# Patient Record
Sex: FEMALE | Race: WHITE | NOT HISPANIC OR LATINO
[De-identification: names, ages, dates, MRNs, and addresses within clinical notes are randomized per-mention and may not be internally consistent; named-entity substitution may affect disease eponyms.]

---

## 2017-02-04 ENCOUNTER — TRANSCRIPTION ENCOUNTER (OUTPATIENT)
Age: 22
End: 2017-02-04

## 2017-04-30 ENCOUNTER — EMERGENCY (EMERGENCY)
Facility: HOSPITAL | Age: 22
LOS: 1 days | Discharge: HOME | End: 2017-04-30
Attending: EMERGENCY MEDICINE
Payer: COMMERCIAL

## 2017-04-30 VITALS
TEMPERATURE: 98 F | RESPIRATION RATE: 20 BRPM | DIASTOLIC BLOOD PRESSURE: 60 MMHG | SYSTOLIC BLOOD PRESSURE: 120 MMHG | OXYGEN SATURATION: 100 % | HEART RATE: 97 BPM | WEIGHT: 141.98 LBS

## 2017-04-30 DIAGNOSIS — R07.89 OTHER CHEST PAIN: ICD-10-CM

## 2017-04-30 DIAGNOSIS — Z79.899 OTHER LONG TERM (CURRENT) DRUG THERAPY: ICD-10-CM

## 2017-04-30 DIAGNOSIS — R00.2 PALPITATIONS: ICD-10-CM

## 2017-04-30 LAB
AMPHET UR-MCNC: NEGATIVE — SIGNIFICANT CHANGE UP
ANION GAP SERPL CALC-SCNC: 13 MMOL/L — SIGNIFICANT CHANGE UP (ref 5–17)
APAP SERPL-MCNC: <7.5 UG/ML — LOW (ref 10–26)
APPEARANCE UR: CLEAR — SIGNIFICANT CHANGE UP
BARBITURATES UR SCN-MCNC: NEGATIVE — SIGNIFICANT CHANGE UP
BASOPHILS # BLD AUTO: 0 K/UL — SIGNIFICANT CHANGE UP (ref 0–0.2)
BASOPHILS NFR BLD AUTO: 0.2 % — SIGNIFICANT CHANGE UP (ref 0–2)
BENZODIAZ UR-MCNC: NEGATIVE — SIGNIFICANT CHANGE UP
BILIRUB UR-MCNC: NEGATIVE — SIGNIFICANT CHANGE UP
BUN SERPL-MCNC: 18 MG/DL — SIGNIFICANT CHANGE UP (ref 8–20)
CALCIUM SERPL-MCNC: 9 MG/DL — SIGNIFICANT CHANGE UP (ref 8.6–10.2)
CHLORIDE SERPL-SCNC: 106 MMOL/L — SIGNIFICANT CHANGE UP (ref 98–107)
CK SERPL-CCNC: 123 U/L — SIGNIFICANT CHANGE UP (ref 25–170)
CO2 SERPL-SCNC: 23 MMOL/L — SIGNIFICANT CHANGE UP (ref 22–29)
COCAINE METAB.OTHER UR-MCNC: NEGATIVE — SIGNIFICANT CHANGE UP
COLOR SPEC: YELLOW — SIGNIFICANT CHANGE UP
CREAT SERPL-MCNC: 0.61 MG/DL — SIGNIFICANT CHANGE UP (ref 0.5–1.3)
DIFF PNL FLD: NEGATIVE — SIGNIFICANT CHANGE UP
EOSINOPHIL # BLD AUTO: 0.1 K/UL — SIGNIFICANT CHANGE UP (ref 0–0.5)
EOSINOPHIL NFR BLD AUTO: 0.9 % — SIGNIFICANT CHANGE UP (ref 0–6)
ETHANOL SERPL-MCNC: <10 MG/DL — SIGNIFICANT CHANGE UP
GLUCOSE SERPL-MCNC: 104 MG/DL — SIGNIFICANT CHANGE UP (ref 70–115)
GLUCOSE UR QL: NEGATIVE MG/DL — SIGNIFICANT CHANGE UP
HCG SERPL-ACNC: <2 MIU/ML — SIGNIFICANT CHANGE UP
HCT VFR BLD CALC: 38 % — SIGNIFICANT CHANGE UP (ref 37–47)
HGB BLD-MCNC: 12.6 G/DL — SIGNIFICANT CHANGE UP (ref 12–16)
KETONES UR-MCNC: NEGATIVE — SIGNIFICANT CHANGE UP
LEUKOCYTE ESTERASE UR-ACNC: NEGATIVE — SIGNIFICANT CHANGE UP
LYMPHOCYTES # BLD AUTO: 1.4 K/UL — SIGNIFICANT CHANGE UP (ref 1–4.8)
LYMPHOCYTES # BLD AUTO: 14.6 % — LOW (ref 20–55)
MCHC RBC-ENTMCNC: 29.4 PG — SIGNIFICANT CHANGE UP (ref 27–31)
MCHC RBC-ENTMCNC: 33.2 G/DL — SIGNIFICANT CHANGE UP (ref 32–36)
MCV RBC AUTO: 88.8 FL — SIGNIFICANT CHANGE UP (ref 81–99)
METHADONE UR-MCNC: NEGATIVE — SIGNIFICANT CHANGE UP
MONOCYTES # BLD AUTO: 0.8 K/UL — SIGNIFICANT CHANGE UP (ref 0–0.8)
MONOCYTES NFR BLD AUTO: 7.7 % — SIGNIFICANT CHANGE UP (ref 3–10)
NEUTROPHILS # BLD AUTO: 7.4 K/UL — SIGNIFICANT CHANGE UP (ref 1.8–8)
NEUTROPHILS NFR BLD AUTO: 76.4 % — HIGH (ref 37–73)
NITRITE UR-MCNC: NEGATIVE — SIGNIFICANT CHANGE UP
OPIATES UR-MCNC: NEGATIVE — SIGNIFICANT CHANGE UP
PCP SPEC-MCNC: SIGNIFICANT CHANGE UP
PCP UR-MCNC: NEGATIVE — SIGNIFICANT CHANGE UP
PH UR: 6.5 — SIGNIFICANT CHANGE UP (ref 5–8)
PLATELET # BLD AUTO: 310 K/UL — SIGNIFICANT CHANGE UP (ref 150–400)
POTASSIUM SERPL-MCNC: 4 MMOL/L — SIGNIFICANT CHANGE UP (ref 3.5–5.3)
POTASSIUM SERPL-SCNC: 4 MMOL/L — SIGNIFICANT CHANGE UP (ref 3.5–5.3)
PROT UR-MCNC: NEGATIVE MG/DL — SIGNIFICANT CHANGE UP
RBC # BLD: 4.28 M/UL — LOW (ref 4.4–5.2)
RBC # FLD: 12.7 % — SIGNIFICANT CHANGE UP (ref 11–15.6)
SALICYLATES SERPL-MCNC: <2 MG/DL — LOW (ref 10–20)
SODIUM SERPL-SCNC: 142 MMOL/L — SIGNIFICANT CHANGE UP (ref 135–145)
SP GR SPEC: 1.01 — SIGNIFICANT CHANGE UP (ref 1.01–1.02)
THC UR QL: NEGATIVE — SIGNIFICANT CHANGE UP
TROPONIN T SERPL-MCNC: <0.01 NG/ML — SIGNIFICANT CHANGE UP (ref 0–0.06)
TSH SERPL-MCNC: 1.51 UIU/ML — SIGNIFICANT CHANGE UP (ref 0.27–4.2)
UROBILINOGEN FLD QL: NEGATIVE MG/DL — SIGNIFICANT CHANGE UP
WBC # BLD: 9.7 K/UL — SIGNIFICANT CHANGE UP (ref 4.8–10.8)
WBC # FLD AUTO: 9.7 K/UL — SIGNIFICANT CHANGE UP (ref 4.8–10.8)

## 2017-04-30 PROCEDURE — 99284 EMERGENCY DEPT VISIT MOD MDM: CPT | Mod: 25

## 2017-04-30 PROCEDURE — 93010 ELECTROCARDIOGRAM REPORT: CPT

## 2017-04-30 PROCEDURE — 85027 COMPLETE CBC AUTOMATED: CPT

## 2017-04-30 PROCEDURE — 82550 ASSAY OF CK (CPK): CPT

## 2017-04-30 PROCEDURE — 71010: CPT | Mod: 26

## 2017-04-30 PROCEDURE — 71045 X-RAY EXAM CHEST 1 VIEW: CPT

## 2017-04-30 PROCEDURE — 84443 ASSAY THYROID STIM HORMONE: CPT

## 2017-04-30 PROCEDURE — 80307 DRUG TEST PRSMV CHEM ANLYZR: CPT

## 2017-04-30 PROCEDURE — 81003 URINALYSIS AUTO W/O SCOPE: CPT

## 2017-04-30 PROCEDURE — 99285 EMERGENCY DEPT VISIT HI MDM: CPT

## 2017-04-30 PROCEDURE — 80048 BASIC METABOLIC PNL TOTAL CA: CPT

## 2017-04-30 PROCEDURE — 84484 ASSAY OF TROPONIN QUANT: CPT

## 2017-04-30 PROCEDURE — 84702 CHORIONIC GONADOTROPIN TEST: CPT

## 2017-04-30 PROCEDURE — 93005 ELECTROCARDIOGRAM TRACING: CPT

## 2017-04-30 RX ORDER — SODIUM CHLORIDE 9 MG/ML
1000 INJECTION INTRAMUSCULAR; INTRAVENOUS; SUBCUTANEOUS ONCE
Qty: 0 | Refills: 0 | Status: COMPLETED | OUTPATIENT
Start: 2017-04-30 | End: 2017-04-30

## 2017-04-30 RX ORDER — SODIUM CHLORIDE 9 MG/ML
1000 INJECTION INTRAMUSCULAR; INTRAVENOUS; SUBCUTANEOUS
Qty: 0 | Refills: 0 | Status: DISCONTINUED | OUTPATIENT
Start: 2017-04-30 | End: 2017-05-04

## 2017-04-30 RX ADMIN — SODIUM CHLORIDE 125 MILLILITER(S): 9 INJECTION INTRAMUSCULAR; INTRAVENOUS; SUBCUTANEOUS at 20:04

## 2017-04-30 RX ADMIN — SODIUM CHLORIDE 2000 MILLILITER(S): 9 INJECTION INTRAMUSCULAR; INTRAVENOUS; SUBCUTANEOUS at 20:04

## 2017-04-30 NOTE — ED PROVIDER NOTE - OBJECTIVE STATEMENT
20 y/o female in ED c/o palpitations and chest discomfort today.  pt states h/o VT 3 yrs ago.  pt denies any fever, HA, n/v/d/abd pain.  tolerating PO.  no sick contacts or recent travel.  pt denies any drugs or alcohol

## 2017-04-30 NOTE — ED ADULT NURSE NOTE - OBJECTIVE STATEMENT
Pt received sitting on stretcher in NAD. Pt AOx3 C/o developing chest pain and SOB while walking up an aisle at work.  Neuro WNL. PERRLA. Lungs CTA, RR even unlabored. Ab soft non tender, + bowel sounds x 4quads. Denies Nausea, Vomiting, Diarrhea. Skin warm, dry, color appropriate for age and race.

## 2017-11-22 ENCOUNTER — TRANSCRIPTION ENCOUNTER (OUTPATIENT)
Age: 22
End: 2017-11-22

## 2018-06-13 ENCOUNTER — TRANSCRIPTION ENCOUNTER (OUTPATIENT)
Age: 23
End: 2018-06-13

## 2018-06-18 ENCOUNTER — TRANSCRIPTION ENCOUNTER (OUTPATIENT)
Age: 23
End: 2018-06-18

## 2018-06-22 ENCOUNTER — TRANSCRIPTION ENCOUNTER (OUTPATIENT)
Age: 23
End: 2018-06-22

## 2019-09-17 ENCOUNTER — EMERGENCY (EMERGENCY)
Facility: HOSPITAL | Age: 24
LOS: 1 days | Discharge: HOME | End: 2019-09-17
Attending: EMERGENCY MEDICINE
Payer: COMMERCIAL

## 2019-09-17 VITALS
RESPIRATION RATE: 22 BRPM | SYSTOLIC BLOOD PRESSURE: 124 MMHG | OXYGEN SATURATION: 98 % | DIASTOLIC BLOOD PRESSURE: 81 MMHG | HEART RATE: 100 BPM | TEMPERATURE: 98 F | HEIGHT: 66 IN | WEIGHT: 115.08 LBS

## 2019-09-17 PROCEDURE — 93005 ELECTROCARDIOGRAM TRACING: CPT

## 2019-09-17 PROCEDURE — 99283 EMERGENCY DEPT VISIT LOW MDM: CPT

## 2019-09-17 PROCEDURE — 93010 ELECTROCARDIOGRAM REPORT: CPT

## 2019-09-17 PROCEDURE — 99284 EMERGENCY DEPT VISIT MOD MDM: CPT

## 2019-09-17 RX ORDER — DIPHENHYDRAMINE HCL 50 MG
25 CAPSULE ORAL ONCE
Refills: 0 | Status: COMPLETED | OUTPATIENT
Start: 2019-09-17 | End: 2019-09-17

## 2019-09-17 RX ADMIN — Medication 40 MILLIGRAM(S): at 22:02

## 2019-09-17 RX ADMIN — Medication 25 MILLIGRAM(S): at 22:01

## 2019-09-17 NOTE — ED ADULT TRIAGE NOTE - CHIEF COMPLAINT QUOTE
"I'm airborne allergic to melania".  Pt was given benedryl at school for allergic reaction, unsure of dose.  Currently with dry intermittent cough, no angioedema, no rash, states throat has never closed before, usually just gets dizzy, has a cough and takes Benedryl.  "My arms and face feels like static", airway is patent, maintaining 98% oxygenation on room air.

## 2019-09-17 NOTE — ED STATDOCS - OBJECTIVE STATEMENT
23yoF; with pmh signif for allergy to tropic fruits; now p/w sob, nausea, and lightheadedness.  denies rash. denies tongue/throat swelling.  PMH: denies  SOCIAL: No tobacco/illicit substance use/socialEtOH

## 2019-09-17 NOTE — ED STATDOCS - PHYSICAL EXAMINATION
Gen: Alert, NAD  Head: NC, AT, PERRL, EOMI, normal lids/conjunctiva  ENT: B TM WNL, normal hearing, patent oropharynx without erythema/exudate, uvula midline  Neck: +supple, no tenderness/meningismus/JVD, +Trachea midline  Pulm: Bilateral BS, normal resp effort, no wheeze/stridor/retractions  CV: RRR, no M/R/G, +dist pulses  Skin: no rash  Neuro: grossly intact

## 2019-09-17 NOTE — ED STATDOCS - NS ED ROS FT
Constitutional: (-) fever  (-)chills  (-)sweats  Eyes/ENT: (-) blurry vision, (-) epistaxis  (-)rhinorrhea   (-) sore throat    Cardiovascular: (-) chest pain, (-) palpitations (-) edema   Respiratory: (-) cough, (+) shortness of breath   Gastrointestinal: (+)nausea  (-)vomiting, (-) diarrhea  (-) abdominal pain   :  (-)dysuria, (-)frequency, (-)urgency, (-)hematuria  Musculoskeletal: (-) neck pain, (-) back pain, (-) joint pain  Integumentary: (-) rash, (-) edema  Neurological: (-) headache, (-) altered mental status  (-)LOC

## 2019-09-18 NOTE — ED ADULT NURSE NOTE - OBJECTIVE STATEMENT
pt triaged, treated and dispo by provider, pt verbalized understanding of discharge instructions, pt medicated prior to discharge, refer to provider notes..

## 2019-09-18 NOTE — ED PROVIDER NOTE - PATIENT PORTAL LINK FT
You can access the FollowMyHealth Patient Portal offered by [facility] by registering at the following website: [email or web address]. By joining [email or web address]’s FollowMyHealth portal, you will also be able to view your health information using other applications (apps) compatible with our system.

## 2019-12-12 ENCOUNTER — APPOINTMENT (OUTPATIENT)
Dept: RHEUMATOLOGY | Facility: CLINIC | Age: 24
End: 2019-12-12
Payer: COMMERCIAL

## 2019-12-12 VITALS
WEIGHT: 126 LBS | BODY MASS INDEX: 22.32 KG/M2 | HEART RATE: 80 BPM | OXYGEN SATURATION: 99 % | DIASTOLIC BLOOD PRESSURE: 74 MMHG | HEIGHT: 63 IN | SYSTOLIC BLOOD PRESSURE: 111 MMHG

## 2019-12-12 DIAGNOSIS — R76.0 RAISED ANTIBODY TITER: ICD-10-CM

## 2019-12-12 PROCEDURE — 99204 OFFICE O/P NEW MOD 45 MIN: CPT

## 2019-12-13 NOTE — PHYSICAL EXAM
[General Appearance - Well Nourished] : well nourished [General Appearance - Alert] : alert [General Appearance - In No Acute Distress] : in no acute distress [General Appearance - Well Developed] : well developed [PERRL With Normal Accommodation] : pupils were equal in size, round, and reactive to light [Sclera] : the sclera and conjunctiva were normal [Both Tympanic Membranes Were Examined] : both tympanic membranes were normal [Examination Of The Oral Cavity] : the lips and gums were normal [Outer Ear] : the ears and nose were normal in appearance [Neck Appearance] : the appearance of the neck was normal [Oropharynx] : the oropharynx was normal [Auscultation Breath Sounds / Voice Sounds] : lungs were clear to auscultation bilaterally [Heart Rate And Rhythm] : heart rate was normal and rhythm regular [Heart Sounds Gallop] : no gallops [Heart Sounds] : normal S1 and S2 [Murmurs] : no murmurs [Bowel Sounds] : normal bowel sounds [Heart Sounds Pericardial Friction Rub] : no pericardial rub [Abdomen Soft] : soft [Skin Lesions] : no skin lesions [Abdomen Tenderness] : non-tender [] : no rash [No Focal Deficits] : no focal deficits [Oriented To Time, Place, And Person] : oriented to person, place, and time [No CVA Tenderness] : no ~M costovertebral angle tenderness [No Spinal Tenderness] : no spinal tenderness [Abnormal Walk] : normal gait [Nail Clubbing] : no clubbing  or cyanosis of the fingernails [Involuntary Movements] : no involuntary movements were seen [Musculoskeletal - Swelling] : no joint swelling seen [Motor Tone] : muscle strength and tone were normal [FreeTextEntry1] : +

## 2019-12-13 NOTE — HISTORY OF PRESENT ILLNESS
[FreeTextEntry1] : 24 year old female with PMH as listed below presents today for an initial evaluation for abnormal blood test. Found to have elevated ROMEO: 1:40? in the setting of hair loss, intermittent polyarthralgias, myalgias,  photosensitivity, oral ulcers, raynauds, fatigue. \par \par She recently started following dermatology for hair loss, not currently on therapy. \par \par Denies swelling to the joints. She states her joint pain is random, occurs once/twice weekly, lasts for few minutes and then resolves. Denies morning stiffness. Not currently taking any medications for pain. \par \par denies fever, chills, weight loss, weight gain, denies dry eye,eye pain, eye redness, vision changes, dry mouth,nasal congestion, difficulty swallowing,  denies ear pain, hearing changes, denies chest pain, chest palpitations, denies sob, denies nausea, vomiting, abdominal pain, diarrhea, constipation, blood in stool, denies dysuria, blood in the urine, denies rashes,  skin thickening, denies blood clots, denies weakness, numbness, syncope, falls, denies depression, anxiety, denies bruising easily\par \par

## 2019-12-13 NOTE — ASSESSMENT
[FreeTextEntry1] : 24 year old female presents today for an initial evaluation for abnormal blood test. Found to have elevated ROMEO: 1:40? in the setting of hair loss, intermittent polyarthralgias, myalgias,  photosensitivity, oral ulcers, raynauds, fatigue. Will do further w/o as below for underlying classic CTD/ systemic autoimmune disease.\par \par - labs as below\par - c/w dermatology f/u for hair loss\par - NSAIDS/Tylenol prn for pain (reviewed risk and benefits of medications)\par - further recommendations after reviewing results\par \par Discussed treatment plan with the patient. The patient was given the opportunity to ask questions and all questions were answered to their satisfaction.\par \par

## 2019-12-27 ENCOUNTER — APPOINTMENT (OUTPATIENT)
Dept: RHEUMATOLOGY | Facility: CLINIC | Age: 24
End: 2019-12-27
Payer: MEDICAID

## 2019-12-27 VITALS
HEART RATE: 95 BPM | HEIGHT: 66 IN | BODY MASS INDEX: 20.09 KG/M2 | RESPIRATION RATE: 17 BRPM | TEMPERATURE: 98.7 F | SYSTOLIC BLOOD PRESSURE: 110 MMHG | OXYGEN SATURATION: 99 % | DIASTOLIC BLOOD PRESSURE: 64 MMHG | WEIGHT: 125 LBS

## 2019-12-27 PROCEDURE — 99214 OFFICE O/P EST MOD 30 MIN: CPT

## 2019-12-27 NOTE — PHYSICAL EXAM
[General Appearance - Well Nourished] : well nourished [General Appearance - In No Acute Distress] : in no acute distress [General Appearance - Alert] : alert [General Appearance - Well Developed] : well developed [Sclera] : the sclera and conjunctiva were normal [Outer Ear] : the ears and nose were normal in appearance [Examination Of The Oral Cavity] : the lips and gums were normal [PERRL With Normal Accommodation] : pupils were equal in size, round, and reactive to light [Neck Appearance] : the appearance of the neck was normal [Both Tympanic Membranes Were Examined] : both tympanic membranes were normal [Oropharynx] : the oropharynx was normal [Auscultation Breath Sounds / Voice Sounds] : lungs were clear to auscultation bilaterally [Heart Sounds Gallop] : no gallops [Heart Sounds] : normal S1 and S2 [Heart Rate And Rhythm] : heart rate was normal and rhythm regular [Heart Sounds Pericardial Friction Rub] : no pericardial rub [Murmurs] : no murmurs [Bowel Sounds] : normal bowel sounds [Abdomen Tenderness] : non-tender [Abdomen Soft] : soft [No Spinal Tenderness] : no spinal tenderness [No CVA Tenderness] : no ~M costovertebral angle tenderness [Abnormal Walk] : normal gait [Musculoskeletal - Swelling] : no joint swelling seen [Involuntary Movements] : no involuntary movements were seen [Nail Clubbing] : no clubbing  or cyanosis of the fingernails [Skin Lesions] : no skin lesions [] : no rash [Motor Tone] : muscle strength and tone were normal [Oriented To Time, Place, And Person] : oriented to person, place, and time [No Focal Deficits] : no focal deficits [FreeTextEntry1] : +thin hair

## 2019-12-27 NOTE — HISTORY OF PRESENT ILLNESS
[FreeTextEntry1] : Pt presenting today for a f.u visit. \par Continues to have intermittent polyarthralgias, myalgias,  fatigue. \par Not currently taking any medications. \par Labs reviewed with pt.\par Denies fever, chills, CP, SOB, abd pain, new rashes. ROS unchanged from prior\par

## 2019-12-27 NOTE — ASSESSMENT
[FreeTextEntry1] : 24 year old female presents today for f/u for abnormal blood test. Found to have elevated ROMEO: 1:40? in the setting of hair loss, intermittent polyarthralgias, myalgias,  photosensitivity, oral ulcers, raynauds, fatigue. Labs  unremarkable. ROS and PE otherwise unremarkable for underlying classic CTD/ systemic autoimmune disease. May have a component of soft tissue rheumatism/fibromyalgia\par \par - c/w dermatology f/u for hair loss\par - encouraged proper diet, good sleep hygiene, exercise \par - start cyclobenzaprine 5mg daily prn at bedtime (reviewed risk and benefits of medications)\par - will consider starting low dose duloxetine in the future\par \par Discussed treatment plan with the patient. The patient was given the opportunity to ask questions and all questions were answered to their satisfaction.\par \par

## 2020-01-10 ENCOUNTER — TRANSCRIPTION ENCOUNTER (OUTPATIENT)
Age: 25
End: 2020-01-10

## 2020-01-17 ENCOUNTER — APPOINTMENT (OUTPATIENT)
Dept: GASTROENTEROLOGY | Facility: CLINIC | Age: 25
End: 2020-01-17
Payer: COMMERCIAL

## 2020-01-17 VITALS
HEART RATE: 70 BPM | RESPIRATION RATE: 15 BRPM | HEIGHT: 66 IN | BODY MASS INDEX: 20.25 KG/M2 | DIASTOLIC BLOOD PRESSURE: 60 MMHG | OXYGEN SATURATION: 98 % | WEIGHT: 126 LBS | SYSTOLIC BLOOD PRESSURE: 90 MMHG

## 2020-01-17 DIAGNOSIS — Z83.79 FAMILY HISTORY OF OTHER DISEASES OF THE DIGESTIVE SYSTEM: ICD-10-CM

## 2020-01-17 PROCEDURE — 99204 OFFICE O/P NEW MOD 45 MIN: CPT

## 2020-01-17 NOTE — CONSULT LETTER
[Dear  ___] : Dear  [unfilled], [Consult Letter:] : I had the pleasure of evaluating your patient, [unfilled]. [Please see my note below.] : Please see my note below. [Consult Closing:] : Thank you very much for allowing me to participate in the care of this patient.  If you have any questions, please do not hesitate to contact me. [FreeTextEntry3] : Very truly yours,\par \par BHARATH Ramirez MD\par [facility]\par Gastroenterology at [place]\par [address], [address]\[place] [address]\par Tel [phone]\par Fax [phone]

## 2020-01-17 NOTE — ASSESSMENT
[FreeTextEntry1] : Patient's complaints and physical exam are concerning for undiagnosed Crohn's ileitis.  Will send her for MR enterography.  Recent blood work, including inflammatory markers were normal.

## 2020-01-17 NOTE — PHYSICAL EXAM
[General Appearance - Alert] : alert [General Appearance - In No Acute Distress] : in no acute distress [Sclera] : the sclera and conjunctiva were normal [PERRL With Normal Accommodation] : pupils were equal in size, round, and reactive to light [Outer Ear] : the ears and nose were normal in appearance [Oropharynx] : the oropharynx was normal [Extraocular Movements] : extraocular movements were intact [Jugular Venous Distention Increased] : there was no jugular-venous distention [Neck Appearance] : the appearance of the neck was normal [Neck Cervical Mass (___cm)] : no neck mass was observed [] : no respiratory distress [Thyroid Diffuse Enlargement] : the thyroid was not enlarged [Thyroid Nodule] : there were no palpable thyroid nodules [Auscultation Breath Sounds / Voice Sounds] : lungs were clear to auscultation bilaterally [Heart Sounds] : normal S1 and S2 [Heart Rate And Rhythm] : heart rate was normal and rhythm regular [Murmurs] : no murmurs [Heart Sounds Gallop] : no gallops [Heart Sounds Pericardial Friction Rub] : no pericardial rub [Flat] : flat [Edema] : there was no peripheral edema [Normal] : normal to percussion [Suprapubic] : in the suprapubic area [No HSM] : no hepatosplenomegaly noted [RLQ] : in the right lower quadrant [Mass ___ cm] : a [unfilled] ~Ucm mass was palpable [Cervical Lymph Nodes Enlarged Anterior Bilaterally] : anterior cervical [Cervical Lymph Nodes Enlarged Posterior Bilaterally] : posterior cervical [Supraclavicular Lymph Nodes Enlarged Bilaterally] : supraclavicular [Nail Clubbing] : no clubbing  or cyanosis of the fingernails [Skin Color & Pigmentation] : normal skin color and pigmentation [No Focal Deficits] : no focal deficits [Skin Turgor] : normal skin turgor [Oriented To Time, Place, And Person] : oriented to person, place, and time [Affect] : the affect was normal [Impaired Insight] : insight and judgment were intact

## 2020-01-17 NOTE — HISTORY OF PRESENT ILLNESS
[de-identified] : This is a 24-year-old female patient with a past medical history fibromyalgia who was referred by her gynecologist for evaluation for right lower quadrant pain.  The patient reports a few month history of stabbing right lower quadrant pain and palpable swelling.  She works the swelling is somewhat improved and the pain is not occurring as often but she describes it as "like someone in driving a knife through me", about one month ago, she was experiencing pain every day, though now it has been less often.  She reports having not much of an appetite but describes her weight has been generally stable.  She had an abdominal ultrasound that was reportedly normal.  She reports moving her bowels three times daily and describes her stools as firm as soft.  She denies any rectal bleeding.  Her family history is notable for a maternal uncle with Crohn's disease.

## 2020-01-26 ENCOUNTER — FORM ENCOUNTER (OUTPATIENT)
Age: 25
End: 2020-01-26

## 2020-01-27 ENCOUNTER — APPOINTMENT (OUTPATIENT)
Dept: MRI IMAGING | Facility: CLINIC | Age: 25
End: 2020-01-27
Payer: MEDICAID

## 2020-01-27 ENCOUNTER — OUTPATIENT (OUTPATIENT)
Dept: OUTPATIENT SERVICES | Facility: HOSPITAL | Age: 25
LOS: 1 days | End: 2020-01-27
Payer: MEDICAID

## 2020-01-27 DIAGNOSIS — R10.31 RIGHT LOWER QUADRANT PAIN: ICD-10-CM

## 2020-01-27 DIAGNOSIS — Z00.8 ENCOUNTER FOR OTHER GENERAL EXAMINATION: ICD-10-CM

## 2020-01-27 PROCEDURE — 72197 MRI PELVIS W/O & W/DYE: CPT | Mod: 26

## 2020-01-27 PROCEDURE — 72197 MRI PELVIS W/O & W/DYE: CPT

## 2020-01-27 PROCEDURE — 74183 MRI ABD W/O CNTR FLWD CNTR: CPT

## 2020-01-27 PROCEDURE — 74183 MRI ABD W/O CNTR FLWD CNTR: CPT | Mod: 26

## 2020-02-10 DIAGNOSIS — R10.31 RIGHT LOWER QUADRANT PAIN: ICD-10-CM

## 2020-02-10 DIAGNOSIS — G89.29 RIGHT LOWER QUADRANT PAIN: ICD-10-CM

## 2020-03-27 ENCOUNTER — APPOINTMENT (OUTPATIENT)
Dept: RHEUMATOLOGY | Facility: CLINIC | Age: 25
End: 2020-03-27

## 2020-05-07 ENCOUNTER — APPOINTMENT (OUTPATIENT)
Dept: RHEUMATOLOGY | Facility: CLINIC | Age: 25
End: 2020-05-07
Payer: MEDICAID

## 2020-05-07 DIAGNOSIS — Z87.09 PERSONAL HISTORY OF OTHER DISEASES OF THE RESPIRATORY SYSTEM: ICD-10-CM

## 2020-05-07 DIAGNOSIS — L65.9 NONSCARRING HAIR LOSS, UNSPECIFIED: ICD-10-CM

## 2020-05-07 PROCEDURE — 99215 OFFICE O/P EST HI 40 MIN: CPT | Mod: 95

## 2020-05-12 PROBLEM — Z87.09 HISTORY OF ASTHMA: Status: RESOLVED | Noted: 2020-05-12 | Resolved: 2020-05-12

## 2020-05-12 PROBLEM — L65.9 ALOPECIA: Status: ACTIVE | Noted: 2020-05-12

## 2020-05-12 RX ORDER — CYCLOBENZAPRINE HYDROCHLORIDE 5 MG/1
5 TABLET, FILM COATED ORAL
Qty: 30 | Refills: 1 | Status: DISCONTINUED | COMMUNITY
Start: 2019-12-27 | End: 2020-05-12

## 2020-05-15 ENCOUNTER — APPOINTMENT (OUTPATIENT)
Dept: NEUROLOGY | Facility: CLINIC | Age: 25
End: 2020-05-15
Payer: MEDICAID

## 2020-05-15 PROCEDURE — 99203 OFFICE O/P NEW LOW 30 MIN: CPT | Mod: 95

## 2020-05-15 NOTE — PHYSICAL EXAM
[FreeTextEntry1] : Exam limited due to limitations of telephonic visit necessitated by coronavirus situation.\par \par \par Mental status: Alert, fully oriented, speech comprehension intact, recent memory intact\par \par Cranial nerves: No ptosis  Extraocular movements full. Facial strength and sensation are normal. Tongue midline.\par \par Motor: Strength grossly normal throughout. There is no drift. Unable to assess tone. No abnormal movements observed.\par \par Sensation: Intact to touch throughout\par \par Coordination: Finger-nose intact\par \par Reflexes unable to check\par \par Gait grossly normal.\par

## 2020-05-15 NOTE — ASSESSMENT
[FreeTextEntry1] : Intractable migraines.\par \par We will initiate prophylaxis with propranolol 40 mg BID. 1 month followup and by phone prior to that if needed.

## 2020-05-15 NOTE — HISTORY OF PRESENT ILLNESS
[FreeTextEntry1] : Reports migraine headaches since age 16. Gets severe throbbing pain that begins over eye and spreads.Severe photophobia. Nausea, rare vomiting. Blurriness of vision. Gets them 1-4 days a week. Uses Excedrin with little relief.\par \par No food or other triggers. No family history of migraines.

## 2020-05-15 NOTE — REASON FOR VISIT
[Home] : at home, [unfilled] , at the time of the visit. [Other Location: e.g. Home (Enter Location, City,State)___] : at [unfilled] [Patient] : the patient [Self] : self [Consultation] : a consultation visit

## 2020-05-19 ENCOUNTER — APPOINTMENT (OUTPATIENT)
Dept: RADIOLOGY | Facility: CLINIC | Age: 25
End: 2020-05-19
Payer: MEDICAID

## 2020-05-19 ENCOUNTER — OUTPATIENT (OUTPATIENT)
Dept: OUTPATIENT SERVICES | Facility: HOSPITAL | Age: 25
LOS: 1 days | End: 2020-05-19
Payer: MEDICAID

## 2020-05-19 DIAGNOSIS — R53.83 OTHER FATIGUE: ICD-10-CM

## 2020-05-19 DIAGNOSIS — M25.50 PAIN IN UNSPECIFIED JOINT: ICD-10-CM

## 2020-05-19 DIAGNOSIS — M79.641 PAIN IN RIGHT HAND: ICD-10-CM

## 2020-05-19 DIAGNOSIS — Z00.00 ENCOUNTER FOR GENERAL ADULT MEDICAL EXAMINATION WITHOUT ABNORMAL FINDINGS: ICD-10-CM

## 2020-05-19 DIAGNOSIS — I73.00 RAYNAUD'S SYNDROME WITHOUT GANGRENE: ICD-10-CM

## 2020-05-19 DIAGNOSIS — M79.7 FIBROMYALGIA: ICD-10-CM

## 2020-05-19 PROCEDURE — 73120 X-RAY EXAM OF HAND: CPT | Mod: 26,LT,RT

## 2020-05-19 PROCEDURE — 73100 X-RAY EXAM OF WRIST: CPT

## 2020-05-19 PROCEDURE — 71046 X-RAY EXAM CHEST 2 VIEWS: CPT | Mod: 26

## 2020-05-19 PROCEDURE — 73100 X-RAY EXAM OF WRIST: CPT | Mod: 26,LT,RT

## 2020-05-19 PROCEDURE — 73630 X-RAY EXAM OF FOOT: CPT | Mod: 26,LT,RT

## 2020-05-19 PROCEDURE — 72110 X-RAY EXAM L-2 SPINE 4/>VWS: CPT | Mod: 26

## 2020-05-19 PROCEDURE — 73120 X-RAY EXAM OF HAND: CPT

## 2020-05-19 PROCEDURE — 72110 X-RAY EXAM L-2 SPINE 4/>VWS: CPT

## 2020-05-19 PROCEDURE — 71046 X-RAY EXAM CHEST 2 VIEWS: CPT

## 2020-05-19 PROCEDURE — 73630 X-RAY EXAM OF FOOT: CPT

## 2020-06-18 ENCOUNTER — APPOINTMENT (OUTPATIENT)
Dept: NEUROLOGY | Facility: CLINIC | Age: 25
End: 2020-06-18
Payer: MEDICAID

## 2020-06-18 VITALS — BODY MASS INDEX: 21.21 KG/M2 | HEIGHT: 66 IN | WEIGHT: 132 LBS

## 2020-06-18 PROCEDURE — 99214 OFFICE O/P EST MOD 30 MIN: CPT

## 2020-06-18 RX ORDER — SULINDAC 200 MG/1
200 TABLET ORAL
Qty: 60 | Refills: 2 | Status: DISCONTINUED | COMMUNITY
Start: 2020-05-12 | End: 2020-06-18

## 2020-06-18 RX ORDER — ASPIRIN ENTERIC COATED TABLETS 81 MG 81 MG/1
81 TABLET, DELAYED RELEASE ORAL
Qty: 100 | Refills: 0 | Status: DISCONTINUED | COMMUNITY
Start: 2020-05-12 | End: 2020-06-18

## 2020-07-07 ENCOUNTER — APPOINTMENT (OUTPATIENT)
Dept: RHEUMATOLOGY | Facility: CLINIC | Age: 25
End: 2020-07-07

## 2020-07-28 ENCOUNTER — APPOINTMENT (OUTPATIENT)
Dept: RHEUMATOLOGY | Facility: CLINIC | Age: 25
End: 2020-07-28
Payer: MEDICAID

## 2020-07-28 VITALS
WEIGHT: 136.13 LBS | TEMPERATURE: 98 F | OXYGEN SATURATION: 99 % | RESPIRATION RATE: 17 BRPM | DIASTOLIC BLOOD PRESSURE: 66 MMHG | BODY MASS INDEX: 22.68 KG/M2 | SYSTOLIC BLOOD PRESSURE: 100 MMHG | HEIGHT: 65 IN | HEART RATE: 79 BPM

## 2020-07-28 DIAGNOSIS — K13.79 OTHER LESIONS OF ORAL MUCOSA: ICD-10-CM

## 2020-07-28 DIAGNOSIS — L65.9 NONSCARRING HAIR LOSS, UNSPECIFIED: ICD-10-CM

## 2020-07-28 DIAGNOSIS — I73.00 RAYNAUD'S SYNDROME W/OUT GANGRENE: ICD-10-CM

## 2020-07-28 PROCEDURE — 99213 OFFICE O/P EST LOW 20 MIN: CPT

## 2020-07-28 RX ORDER — CYCLOBENZAPRINE HYDROCHLORIDE 10 MG/1
10 TABLET, FILM COATED ORAL
Qty: 30 | Refills: 2 | Status: DISCONTINUED | COMMUNITY
Start: 2020-05-12 | End: 2020-07-28

## 2020-07-28 NOTE — HISTORY OF PRESENT ILLNESS
[FreeTextEntry1] : Pt presenting today for a f.u visit. Continues to have intermittent polyarthralgias, myalgias,  oral ulcers, fatigue. Denies swelling to the joints. Labs and imaging reviewed with pt. Not currently taking any medications for symptoms. s/p trail with cyclobenzaprine 10mg- stopped as it caused drowsiness. Sulindac- developed side effects. \par Denies fever, chills, CP, SOB, abd pain, rashes

## 2020-07-28 NOTE — ASSESSMENT
[FreeTextEntry1] : 24 year old female presents today for an f.u visit. Has hx of elevated ROMEO: 1:40 in the setting of hair loss, intermittent polyarthralgias, myalgias,  photosensitivity, oral ulcers, raynauds, fatigue. Labs and imaging thus far unremarkable. s/p trail with cyclobenzaprine 10mg daily- stopped as it caused drowsiness. Sulindac- developed side effects. \par \par - start duloxetine 30 mg daily  (reviewed risk and benefits of medications)\par - c/w dermatology f/u for hair loss\par - NSAIDS/Tylenol prn for pain (reviewed risk and benefits of medications)\par - encouraged proper diet, good sleep hygiene, exercise \par \par Discussed treatment plan with the patient. The patient was given the opportunity to ask questions and all questions were answered to their satisfaction.

## 2020-07-28 NOTE — PHYSICAL EXAM
[General Appearance - Alert] : alert [General Appearance - In No Acute Distress] : in no acute distress [General Appearance - Well Nourished] : well nourished [General Appearance - Well Developed] : well developed [Sclera] : the sclera and conjunctiva were normal [PERRL With Normal Accommodation] : pupils were equal in size, round, and reactive to light [Examination Of The Oral Cavity] : the lips and gums were normal [Outer Ear] : the ears and nose were normal in appearance [Oropharynx] : the oropharynx was normal [Both Tympanic Membranes Were Examined] : both tympanic membranes were normal [Neck Appearance] : the appearance of the neck was normal [Auscultation Breath Sounds / Voice Sounds] : lungs were clear to auscultation bilaterally [Heart Rate And Rhythm] : heart rate was normal and rhythm regular [Heart Sounds Gallop] : no gallops [Heart Sounds] : normal S1 and S2 [Murmurs] : no murmurs [Heart Sounds Pericardial Friction Rub] : no pericardial rub [Bowel Sounds] : normal bowel sounds [Abdomen Tenderness] : non-tender [Abdomen Soft] : soft [No CVA Tenderness] : no ~M costovertebral angle tenderness [Abnormal Walk] : normal gait [No Spinal Tenderness] : no spinal tenderness [Nail Clubbing] : no clubbing  or cyanosis of the fingernails [Involuntary Movements] : no involuntary movements were seen [Musculoskeletal - Swelling] : no joint swelling seen [] : no rash [Motor Tone] : muscle strength and tone were normal [No Focal Deficits] : no focal deficits [Skin Lesions] : no skin lesions [Oriented To Time, Place, And Person] : oriented to person, place, and time [FreeTextEntry1] : +diffuse soft tissue tenderness

## 2020-10-15 ENCOUNTER — APPOINTMENT (OUTPATIENT)
Dept: PHYSICAL MEDICINE AND REHAB | Facility: CLINIC | Age: 25
End: 2020-10-15

## 2020-10-15 ENCOUNTER — APPOINTMENT (OUTPATIENT)
Dept: PHYSICAL MEDICINE AND REHAB | Facility: CLINIC | Age: 25
End: 2020-10-15
Payer: MEDICAID

## 2020-10-15 DIAGNOSIS — M22.2X1 PATELLOFEMORAL DISORDERS, RIGHT KNEE: ICD-10-CM

## 2020-10-15 PROCEDURE — 99202 OFFICE O/P NEW SF 15 MIN: CPT

## 2020-10-15 NOTE — HISTORY OF PRESENT ILLNESS
[FreeTextEntry1] : Location: right knee\par Quality:  sharp\par Duration: 2 weeks\par \par Frequency: constant\par Alleviating Factors: rest\par Aggravating Factors: walking, standing\par Conservative treatment tried:  none\par Associated Symptoms: swelling in the right knee\par Prior Studies: xr at [facility] - normal\par

## 2020-10-15 NOTE — ASSESSMENT
[FreeTextEntry1] : We will begin with conservative management including physical therapy and oral anti-inflammatories (voltaren gel). If patient has not improved after 6 weeks consider MRI imaging at that time.\par

## 2020-10-16 PROBLEM — M22.2X1 PATELLOFEMORAL PAIN SYNDROME OF RIGHT KNEE: Status: ACTIVE | Noted: 2020-10-15

## 2020-10-16 NOTE — HISTORY OF PRESENT ILLNESS
[FreeTextEntry1] : This is CEDRICK COTO,  a pleasant 24 year-old female with  \par \par Location: right knee pain\par Onset/timing: x 2 weeks\par Frequency: constant\par Quality: sharp\par Severity: 7-8/10\par Aggravating Factor: walking, standing\par Relieving factor: rest\par Associated symptoms: swelling to right knee; otherwise denies radicular pain, weakness, paresthesia, B/B incontinence, or saddle anesthesia \par Prior studies: [facility] - normal

## 2020-10-16 NOTE — PHYSICAL EXAM
[FreeTextEntry1] : General: NAD, alert\par Psych: normal mood and affect\par HEENT: NC/AT, normal visual tracking\par Pulmonary: no resp distress, chest expansion appears symmetrical\par CV: extremities are warm and perfused\par Abd: non-distended\par Ext: no c/c/e\par normal skin color and appearance\par \par Knee:\par Inspection: no arthritic changes, normal muscle bulk without asymmetry\par Tenderness to palpation: right joint line tenderness\par ROM: within functional limits \par MMT: 5/5 bilateral lower extremities\par Reflexes: symmetric bilateral achilles and patella \par Sensory: intact to light touch in all dermatomes of the bilateral lower extremities.\par patella grind +pos right\par no knee instability noted

## 2020-10-16 NOTE — ASSESSMENT
[FreeTextEntry1] : CEDRICK COTO,  a 24 year-old female with right patellofemoral pain syndrome.\par Discussed all potential treatment options including PT, oral medications, and interventional procedures\par \par - start conservative treatment with PT and medication\par - discussed NSAID use - oral and topical (voltaren gel). Cautioned in regards to chronic oral NSAIDs with risk of GI/cardiac/renal toxicities\par - f/u in 6 weeks - if pain persist will consider MRI to better guide treatment plan

## 2020-10-27 ENCOUNTER — APPOINTMENT (OUTPATIENT)
Dept: RHEUMATOLOGY | Facility: CLINIC | Age: 25
End: 2020-10-27
Payer: MEDICAID

## 2020-10-27 VITALS
WEIGHT: 140 LBS | HEIGHT: 66 IN | OXYGEN SATURATION: 97 % | TEMPERATURE: 98.2 F | DIASTOLIC BLOOD PRESSURE: 66 MMHG | SYSTOLIC BLOOD PRESSURE: 80 MMHG | RESPIRATION RATE: 17 BRPM | BODY MASS INDEX: 22.5 KG/M2 | HEART RATE: 85 BPM

## 2020-10-27 PROCEDURE — 99214 OFFICE O/P EST MOD 30 MIN: CPT | Mod: 25

## 2020-10-27 PROCEDURE — 99072 ADDL SUPL MATRL&STAF TM PHE: CPT

## 2020-10-27 RX ORDER — SUMATRIPTAN 50 MG/1
50 TABLET, FILM COATED ORAL
Qty: 9 | Refills: 6 | Status: DISCONTINUED | COMMUNITY
Start: 2020-06-05 | End: 2020-10-27

## 2020-10-27 NOTE — ASSESSMENT
[FreeTextEntry1] : Fibromyalgia\par \par - increase duloxetine to 60 mg daily  (reviewed risk and benefits of medications)\par - c/w dermatology f/u for hair loss\par - NSAIDS/Tylenol prn for pain (reviewed risk and benefits of medications)\par - encouraged proper diet, good sleep hygiene, exercise \par \par Discussed treatment plan with the patient. The patient was given the opportunity to ask questions and all questions were answered to their satisfaction.

## 2020-10-27 NOTE — HISTORY OF PRESENT ILLNESS
[FreeTextEntry1] : Pt presenting today for a f.u visit. Doing well overall on duloxetine 30 mg daily. Tolerating medication well. Denies side effects. Today notes to have mild intermittent polyarthralgia, fatigue. Denies swelling to the joints. \par Denies fever, chills, CP, SOB, abd pain, rashes

## 2020-10-27 NOTE — PHYSICAL EXAM
[General Appearance - Alert] : alert [General Appearance - In No Acute Distress] : in no acute distress [General Appearance - Well Nourished] : well nourished [General Appearance - Well Developed] : well developed [Sclera] : the sclera and conjunctiva were normal [PERRL With Normal Accommodation] : pupils were equal in size, round, and reactive to light [Outer Ear] : the ears and nose were normal in appearance [Examination Of The Oral Cavity] : the lips and gums were normal [Both Tympanic Membranes Were Examined] : both tympanic membranes were normal [Oropharynx] : the oropharynx was normal [Neck Appearance] : the appearance of the neck was normal [Auscultation Breath Sounds / Voice Sounds] : lungs were clear to auscultation bilaterally [Heart Rate And Rhythm] : heart rate was normal and rhythm regular [Heart Sounds] : normal S1 and S2 [Heart Sounds Gallop] : no gallops [Murmurs] : no murmurs [Heart Sounds Pericardial Friction Rub] : no pericardial rub [Bowel Sounds] : normal bowel sounds [Abdomen Soft] : soft [Abdomen Tenderness] : non-tender [No CVA Tenderness] : no ~M costovertebral angle tenderness [No Spinal Tenderness] : no spinal tenderness [Abnormal Walk] : normal gait [Nail Clubbing] : no clubbing  or cyanosis of the fingernails [Involuntary Movements] : no involuntary movements were seen [Musculoskeletal - Swelling] : no joint swelling seen [Motor Tone] : muscle strength and tone were normal [] : no rash [Skin Lesions] : no skin lesions [No Focal Deficits] : no focal deficits [Oriented To Time, Place, And Person] : oriented to person, place, and time [FreeTextEntry1] : +diffuse soft tissue tenderness

## 2020-12-21 ENCOUNTER — APPOINTMENT (OUTPATIENT)
Dept: NEUROLOGY | Facility: CLINIC | Age: 25
End: 2020-12-21

## 2021-01-04 ENCOUNTER — EMERGENCY (EMERGENCY)
Facility: HOSPITAL | Age: 26
LOS: 1 days | Discharge: HOME | End: 2021-01-04
Attending: STUDENT IN AN ORGANIZED HEALTH CARE EDUCATION/TRAINING PROGRAM
Payer: COMMERCIAL

## 2021-01-04 VITALS
TEMPERATURE: 99 F | WEIGHT: 149.91 LBS | SYSTOLIC BLOOD PRESSURE: 116 MMHG | DIASTOLIC BLOOD PRESSURE: 68 MMHG | OXYGEN SATURATION: 100 % | HEART RATE: 123 BPM | RESPIRATION RATE: 18 BRPM | HEIGHT: 66 IN

## 2021-01-04 VITALS — TEMPERATURE: 98 F | HEART RATE: 93 BPM

## 2021-01-04 LAB
ALBUMIN SERPL ELPH-MCNC: 4.2 G/DL — SIGNIFICANT CHANGE UP (ref 3.3–5.2)
ALP SERPL-CCNC: 48 U/L — SIGNIFICANT CHANGE UP (ref 40–120)
ALT FLD-CCNC: 14 U/L — SIGNIFICANT CHANGE UP
ANION GAP SERPL CALC-SCNC: 10 MMOL/L — SIGNIFICANT CHANGE UP (ref 5–17)
APPEARANCE UR: CLEAR — SIGNIFICANT CHANGE UP
AST SERPL-CCNC: 17 U/L — SIGNIFICANT CHANGE UP
BASOPHILS # BLD AUTO: 0.03 K/UL — SIGNIFICANT CHANGE UP (ref 0–0.2)
BASOPHILS NFR BLD AUTO: 0.5 % — SIGNIFICANT CHANGE UP (ref 0–2)
BILIRUB SERPL-MCNC: 0.3 MG/DL — LOW (ref 0.4–2)
BILIRUB UR-MCNC: NEGATIVE — SIGNIFICANT CHANGE UP
BUN SERPL-MCNC: 10 MG/DL — SIGNIFICANT CHANGE UP (ref 8–20)
CALCIUM SERPL-MCNC: 9.3 MG/DL — SIGNIFICANT CHANGE UP (ref 8.6–10.2)
CHLORIDE SERPL-SCNC: 105 MMOL/L — SIGNIFICANT CHANGE UP (ref 98–107)
CO2 SERPL-SCNC: 23 MMOL/L — SIGNIFICANT CHANGE UP (ref 22–29)
COLOR SPEC: YELLOW — SIGNIFICANT CHANGE UP
CREAT SERPL-MCNC: 0.72 MG/DL — SIGNIFICANT CHANGE UP (ref 0.5–1.3)
DIFF PNL FLD: NEGATIVE — SIGNIFICANT CHANGE UP
EOSINOPHIL # BLD AUTO: 0.11 K/UL — SIGNIFICANT CHANGE UP (ref 0–0.5)
EOSINOPHIL NFR BLD AUTO: 1.7 % — SIGNIFICANT CHANGE UP (ref 0–6)
GLUCOSE SERPL-MCNC: 119 MG/DL — HIGH (ref 70–99)
GLUCOSE UR QL: NEGATIVE MG/DL — SIGNIFICANT CHANGE UP
HCG SERPL-ACNC: <4 MIU/ML — SIGNIFICANT CHANGE UP
HCT VFR BLD CALC: 39.9 % — SIGNIFICANT CHANGE UP (ref 34.5–45)
HGB BLD-MCNC: 13.6 G/DL — SIGNIFICANT CHANGE UP (ref 11.5–15.5)
IMM GRANULOCYTES NFR BLD AUTO: 0.2 % — SIGNIFICANT CHANGE UP (ref 0–1.5)
KETONES UR-MCNC: NEGATIVE — SIGNIFICANT CHANGE UP
LEUKOCYTE ESTERASE UR-ACNC: NEGATIVE — SIGNIFICANT CHANGE UP
LYMPHOCYTES # BLD AUTO: 0.91 K/UL — LOW (ref 1–3.3)
LYMPHOCYTES # BLD AUTO: 14.2 % — SIGNIFICANT CHANGE UP (ref 13–44)
MCHC RBC-ENTMCNC: 29.8 PG — SIGNIFICANT CHANGE UP (ref 27–34)
MCHC RBC-ENTMCNC: 34.1 GM/DL — SIGNIFICANT CHANGE UP (ref 32–36)
MCV RBC AUTO: 87.3 FL — SIGNIFICANT CHANGE UP (ref 80–100)
MONOCYTES # BLD AUTO: 0.63 K/UL — SIGNIFICANT CHANGE UP (ref 0–0.9)
MONOCYTES NFR BLD AUTO: 9.8 % — SIGNIFICANT CHANGE UP (ref 2–14)
NEUTROPHILS # BLD AUTO: 4.74 K/UL — SIGNIFICANT CHANGE UP (ref 1.8–7.4)
NEUTROPHILS NFR BLD AUTO: 73.6 % — SIGNIFICANT CHANGE UP (ref 43–77)
NITRITE UR-MCNC: NEGATIVE — SIGNIFICANT CHANGE UP
PH UR: 7 — SIGNIFICANT CHANGE UP (ref 5–8)
PLATELET # BLD AUTO: 297 K/UL — SIGNIFICANT CHANGE UP (ref 150–400)
POTASSIUM SERPL-MCNC: 4.3 MMOL/L — SIGNIFICANT CHANGE UP (ref 3.5–5.3)
POTASSIUM SERPL-SCNC: 4.3 MMOL/L — SIGNIFICANT CHANGE UP (ref 3.5–5.3)
PROT SERPL-MCNC: 7 G/DL — SIGNIFICANT CHANGE UP (ref 6.6–8.7)
PROT UR-MCNC: NEGATIVE MG/DL — SIGNIFICANT CHANGE UP
RBC # BLD: 4.57 M/UL — SIGNIFICANT CHANGE UP (ref 3.8–5.2)
RBC # FLD: 11.9 % — SIGNIFICANT CHANGE UP (ref 10.3–14.5)
SARS-COV-2 RNA SPEC QL NAA+PROBE: SIGNIFICANT CHANGE UP
SODIUM SERPL-SCNC: 138 MMOL/L — SIGNIFICANT CHANGE UP (ref 135–145)
SP GR SPEC: 1.01 — SIGNIFICANT CHANGE UP (ref 1.01–1.02)
UROBILINOGEN FLD QL: NEGATIVE MG/DL — SIGNIFICANT CHANGE UP
WBC # BLD: 6.43 K/UL — SIGNIFICANT CHANGE UP (ref 3.8–10.5)
WBC # FLD AUTO: 6.43 K/UL — SIGNIFICANT CHANGE UP (ref 3.8–10.5)

## 2021-01-04 PROCEDURE — 85025 COMPLETE CBC W/AUTO DIFF WBC: CPT

## 2021-01-04 PROCEDURE — 99283 EMERGENCY DEPT VISIT LOW MDM: CPT

## 2021-01-04 PROCEDURE — 99284 EMERGENCY DEPT VISIT MOD MDM: CPT

## 2021-01-04 PROCEDURE — 93005 ELECTROCARDIOGRAM TRACING: CPT

## 2021-01-04 PROCEDURE — U0005: CPT

## 2021-01-04 PROCEDURE — 87086 URINE CULTURE/COLONY COUNT: CPT

## 2021-01-04 PROCEDURE — 93010 ELECTROCARDIOGRAM REPORT: CPT

## 2021-01-04 PROCEDURE — U0003: CPT

## 2021-01-04 PROCEDURE — 84702 CHORIONIC GONADOTROPIN TEST: CPT

## 2021-01-04 PROCEDURE — 36415 COLL VENOUS BLD VENIPUNCTURE: CPT

## 2021-01-04 PROCEDURE — 81003 URINALYSIS AUTO W/O SCOPE: CPT

## 2021-01-04 PROCEDURE — 80053 COMPREHEN METABOLIC PANEL: CPT

## 2021-01-04 RX ORDER — SODIUM CHLORIDE 9 MG/ML
1000 INJECTION INTRAMUSCULAR; INTRAVENOUS; SUBCUTANEOUS ONCE
Refills: 0 | Status: COMPLETED | OUTPATIENT
Start: 2021-01-04 | End: 2021-01-04

## 2021-01-04 RX ADMIN — SODIUM CHLORIDE 1000 MILLILITER(S): 9 INJECTION INTRAMUSCULAR; INTRAVENOUS; SUBCUTANEOUS at 02:37

## 2021-01-04 NOTE — ED PROVIDER NOTE - CLINICAL SUMMARY MEDICAL DECISION MAKING FREE TEXT BOX
26 yo female PMHx migraines (Propanolol 40mg BID), fibromyalgia, (Cymbalta), LMP: 12/12 presents to ED c/o dizziness x3 days.

## 2021-01-04 NOTE — ED PROVIDER NOTE - NSFOLLOWUPINSTRUCTIONS_ED_ALL_ED_FT
- Please keep scheduled appointment with neurologist.  - Please bring all documentation from your ED visit to any related future follow up appointment.  - Please call to schedule follow up appointment with your primary care physician within 24-48 hours.  - Please seek immediate medical attention for any new/worsening, signs/symptoms, or concerns.    Feel better!       Dizziness    WHAT YOU NEED TO KNOW:    What is dizziness? Dizziness is a feeling of being off balance or unsteady. Common causes of dizziness are an inner ear fluid imbalance or a lack of oxygen in your blood. Dizziness may be acute (lasts 3 days or less) or chronic (lasts longer than 3 days). You may have dizzy spells that last from seconds to a few hours.     What increases my risk for dizziness? Dizziness may get worse during certain activities or when you move a certain way. The following may also increase your risk for dizziness:   •Older age      •An infection, ear surgery, or an inner ear condition, such as Ménière disease      •Stroke, a brain tumor, or a recent head trauma       •An injury that causes a large amount of blood loss      •Heart or blood pressure problems       •Exposure to chemicals, or long-term alcohol use       •Medicines used to treat high blood pressure, seizure disorders, or anxiety and depression       •A nerve disorder, such as multiple sclerosis      What signs and symptoms may happen with dizziness?   •A feeling that your surroundings are moving even though you are standing still      •Ringing in your ears or hearing loss       •Feeling faint or lightheaded       •Weakness or unsteadiness       •Double vision or eye movements you cannot control      •Nausea or vomiting       •Confusion      How is the cause of dizziness diagnosed? Your healthcare provider may ask when the dizziness started. Tell the provider if you have dizzy spells, and how long they last. Tell him or her what happens before you become dizzy. The provider will ask if you have other health conditions and if you take any medicines. He or she will check your blood pressure and pulse to see if your dizziness may be related to your heart. Your balance, strength, reflexes, and the way you walk may also be checked. You may need any of the following tests to help find the cause of your dizziness:   •An EKG records the electrical activity of your heart. An EKG can be used to check for an abnormal heart beat or heart damage.      •Blood tests will check your blood sugar level, infection, and your blood cell levels.       •CT or MRI pictures check for a stroke, head injury, or brain tumor. They also check for brain bleeding or swelling. You may be given contrast liquid to help your brain show up better in the pictures. Tell the healthcare provider if you have ever had an allergic reaction to contrast liquid. Do not enter the MRI room with anything metal. Metal can cause serious injury. Tell the healthcare provider if you have any metal in or on your body.      How is dizziness treated? Treatment will depend on the cause of your dizziness. Your healthcare provider may give you oxygen or medicines to decrease your dizziness and nausea. He may also refer you to a specialist. You may need to be admitted to the hospital for treatment.    How can I manage my symptoms?   •Do not drive or operate heavy machinery when you are dizzy.       •Get up slowly from sitting or lying down.       •Drink plenty of liquids. Liquids help prevent dehydration. Ask how much liquid to drink each day and which liquids are best for you.      When should I seek immediate care?   •You have a headache and a stiff neck.      •You have shaking chills and a fever.       •You vomit over and over with no relief.       •Your vomit or bowel movements are red or black.       •You have pain in your chest, back, or abdomen.       •You have numbness, especially in your face, arms, or legs.       •You have trouble moving your arms or legs.       •You are confused.       When should I contact my healthcare provider?   •You have a fever.       •Your symptoms do not get better with treatment.       •You have questions or concerns about your condition or care.       CARE AGREEMENT:    You have the right to help plan your care. Learn about your health condition and how it may be treated. Discuss treatment options with your healthcare providers to decide what care you want to receive. You always have the right to refuse treatment.

## 2021-01-04 NOTE — ED PROVIDER NOTE - PHYSICAL EXAMINATION
General: In NAD, non-toxic/well-appearing; well nourished/developed.  Skin: No rashes or lesions. Warm, dry, color normal for race.   Head: Normocephalic/atraumatic.   Eyes: Sclera anicteric, conjunctivae clear b/l. PERRLA, EOMI. No nystagmus.  Neck: Supple, FROM.  Cardio: No lifts, heaves, visible pulsations. No thrills. Rate and rhythm regular, S1 & S2 clear. No audible murmur, gallop, or rub.   PV: Pulses: b/l 2+ radial, DP, PT. Capillary refill <2 seconds.  Resp: Normal AP to lateral diameter, symmetrical excursion b/l. Breath sounds vesicular, symmetrical and without rales, rhonchi or wheezing b/l.  Abd: Non-distended. Soft, non-tender, no masses palpated. No rebound, guarding.   MSK/Neuro: A&Ox3. CN II-XII grossly intact. FROM. Strength 5/5 upper and lower extremities. Sensation intact to bilateral upper and lower extremities. Normal point-to-point test. Normal gait.

## 2021-01-04 NOTE — ED PROVIDER NOTE - OBJECTIVE STATEMENT
24 yo female PMHx migraines (Propanolol 40mg BID), fibromyalgia, (Cymbalta), LMP: 12/12  presents to ED c/o dizziness x3 days. Dizziness constant,  Patient also with headache relieved with acetaminophen; ran out of Propanolol 2 days ago. Appointment with neurologist next week. Patient works in COVID lab processing specimens, proper PPE.   Denies blood thinners, OCP use, fevers, visual changes.   Neuro: Ming 24 yo female PMHx migraines (Propanolol 40mg BID), fibromyalgia, (Cymbalta), LMP: 12/12 presents to ED c/o dizziness x3 days. Dizziness constant. Patient normally suffers from dizziness associated with migraines, but no headache presently. Patient also with headache relieved with acetaminophen; ran out of Propanolol 2 days ago; prescription at pharmacy. Appointment with neurologist next week. Patient works in COVID lab processing specimens, proper PPE. No further complaints at this time.   Denies blood thinners, OCP use, LOC, fevers, visual changes, abdominal pain.   Neuro: Ming

## 2021-01-04 NOTE — ED PROVIDER NOTE - PROGRESS NOTE DETAILS
MIQUEL Queen: Improved. Advised continue hydration, rest, follow up with neurologist, return precautions.

## 2021-01-04 NOTE — ED ADULT TRIAGE NOTE - CHIEF COMPLAINT QUOTE
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

## 2021-01-04 NOTE — ED PROVIDER NOTE - PATIENT PORTAL LINK FT
You can access the FollowMyHealth Patient Portal offered by [facility] by registering at the following website: [email or web address]. By joining [employer]’s FollowMyHealth portal, you will also be able to view your health information using other applications (apps) compatible with our system.

## 2021-01-04 NOTE — ED PROVIDER NOTE - ATTENDING CONTRIBUTION TO CARE
26 yo female with sensation of lightheadedness constantly for several days. I personally saw the patient with the PA, and completed the key components of the history and physical exam. I then discussed the management plan with the PA.

## 2021-01-04 NOTE — ED ADULT NURSE NOTE - OBJECTIVE STATEMENT
Pt is A&Ox4, in NAD. Presents to ED c/o dizziness + headaches x2 days. Pt states the room feels like it's spinning whenever she moves at all. Reports hx of migraines and states she ran out of her medication 2 days ago and is hoping the dizziness is related to that. States her headaches have been in the morning and has taken Tylenol for them with relief. Denies having a headache at this time. Awaiting orders at this time, will continue to monitor.

## 2021-01-05 LAB
CULTURE RESULTS: SIGNIFICANT CHANGE UP
SPECIMEN SOURCE: SIGNIFICANT CHANGE UP

## 2021-01-13 ENCOUNTER — APPOINTMENT (OUTPATIENT)
Dept: NEUROLOGY | Facility: CLINIC | Age: 26
End: 2021-01-13

## 2021-01-15 ENCOUNTER — APPOINTMENT (OUTPATIENT)
Dept: RHEUMATOLOGY | Facility: CLINIC | Age: 26
End: 2021-01-15

## 2021-02-22 PROBLEM — M79.7 FIBROMYALGIA: Chronic | Status: ACTIVE | Noted: 2021-01-04

## 2021-02-23 ENCOUNTER — APPOINTMENT (OUTPATIENT)
Dept: RHEUMATOLOGY | Facility: CLINIC | Age: 26
End: 2021-02-23
Payer: MEDICAID

## 2021-02-23 VITALS
TEMPERATURE: 97.6 F | BODY MASS INDEX: 23.95 KG/M2 | SYSTOLIC BLOOD PRESSURE: 104 MMHG | HEIGHT: 66 IN | OXYGEN SATURATION: 98 % | HEART RATE: 96 BPM | RESPIRATION RATE: 17 BRPM | WEIGHT: 149 LBS | DIASTOLIC BLOOD PRESSURE: 78 MMHG

## 2021-02-23 PROCEDURE — 99072 ADDL SUPL MATRL&STAF TM PHE: CPT

## 2021-02-23 PROCEDURE — 99214 OFFICE O/P EST MOD 30 MIN: CPT

## 2021-02-23 NOTE — ASSESSMENT
[FreeTextEntry1] : Fibromyalgia\par \par - c/w duloxetine to 60 mg daily\par - c/w cyclobenzaprine 7.5mg at bedtime prn\par - start Celebrex 200mg daily prn \par - reviewed risk and benefits of medications\par - encouraged proper diet, good sleep hygiene, exercise \par \par Discussed treatment plan with the patient. The patient was given the opportunity to ask questions and all questions were answered to their satisfaction.

## 2021-02-23 NOTE — HISTORY OF PRESENT ILLNESS
[FreeTextEntry1] : Pt presenting today for a f.u visit. Doing well overall on duloxetine 60 mg daily, cyclobenzaprine 7.5mg at bedtime prn. Tolerating medication well. Denies side effects. Today notes to have mild intermittent polyarthralgia, fatigue. Denies swelling to the joints. \par Denies fever, chills, CP, SOB, abd pain, rashes

## 2021-02-23 NOTE — REVIEW OF SYSTEMS
[As Noted in HPI] : as noted in HPI [Negative] : Heme/Lymph [Easy Bleeding] : no tendency for easy bleeding

## 2021-04-15 ENCOUNTER — RX RENEWAL (OUTPATIENT)
Age: 26
End: 2021-04-15

## 2021-05-21 ENCOUNTER — APPOINTMENT (OUTPATIENT)
Dept: NEUROLOGY | Facility: CLINIC | Age: 26
End: 2021-05-21
Payer: MEDICAID

## 2021-05-21 VITALS
TEMPERATURE: 97.5 F | DIASTOLIC BLOOD PRESSURE: 70 MMHG | HEART RATE: 72 BPM | WEIGHT: 160 LBS | SYSTOLIC BLOOD PRESSURE: 114 MMHG | HEIGHT: 66 IN | BODY MASS INDEX: 25.71 KG/M2

## 2021-05-21 DIAGNOSIS — Z86.69 PERSONAL HISTORY OF OTHER DISEASES OF THE NERVOUS SYSTEM AND SENSE ORGANS: ICD-10-CM

## 2021-05-21 DIAGNOSIS — G43.909 MIGRAINE, UNSPECIFIED, NOT INTRACTABLE, W/OUT STATUS MIGRAINOSUS: ICD-10-CM

## 2021-05-21 DIAGNOSIS — R25.9 UNSPECIFIED ABNORMAL INVOLUNTARY MOVEMENTS: ICD-10-CM

## 2021-05-21 DIAGNOSIS — Z78.9 OTHER SPECIFIED HEALTH STATUS: ICD-10-CM

## 2021-05-21 PROCEDURE — 99215 OFFICE O/P EST HI 40 MIN: CPT

## 2021-05-21 NOTE — REVIEW OF SYSTEMS
[Feeling Tired] : feeling tired [As Noted in HPI] : as noted in HPI [Dizziness] : dizziness [Vertigo] : vertigo [Sleep Disturbances] : sleep disturbances [Anxiety] : anxiety [Depression] : depression [Joint Pain] : joint pain [Migraine Headache] : migraine headaches [Negative] : Eyes [de-identified] : Shaking episodes [FreeTextEntry9] : Neck pain

## 2021-05-21 NOTE — HISTORY OF PRESENT ILLNESS
[FreeTextEntry1] : 25 year old RH female with PMHx of asthma, migarine HA's, anxiety, fibromyalgia presents today for evaluation of involuntary shaking episodes  that usually occur when she is trying to sleep at night. Pt reports that since past year, she has what seems like flinging movements of arms, R > L side, occur when she is just going to sleep, she feels it as twitches, is aware of it, is unable to control it, she reports it was occasional previously but has increased in frequency and intensity, occurs at least 2-3 / week. Pt does report that her ex-fiance had told her that she thrashes and kicks-hits during her sleep, she adame snot recall her mothers reporting such events, she did not share her room during growing up years. She denies any aura, visual symptoms or bladder incontinence associated with these episodes.\par \par Pt has had migraines since she was 17 years of age, typical migraine starts as periorbital pain, associated with nausea, photophobia, visual blurring, and occasional dizziness / vomiting, HA are severe, occurred 3-4 / week, took Excedrin, she was evaluated by Dr. Lai in 2020, started on Propranalol 40 mg bid, she noted remarkable reduction in the frequency of HA but has adverse affects - BP, heart rate issues, dose was reduced to 40 mg daily, she is tolerating the dose well and has occasional HA only. \par \par Pt is on cymbalta for Fibromyalgia, her symptoms are well controlled

## 2021-05-21 NOTE — DISCUSSION/SUMMARY
[FreeTextEntry1] : 25 year old RH female with PMHx of asthma, migarine HA's, anxiety, fibromyalgia presents for evaluation of involuntary shaking episodes  that usually occur when she is trying to sleep at night, she has what she describes as flinging movements of arms, R > L side, at times feels it as twitches, is aware of it,  unable to control it, occurs at least 2-3 / week; her ex-fiance had told her that she thrashes and kicks-hits during her sleep, she denies any aura, visual symptoms or bladder incontinence with these episodes.\par \par # Involuntary flinging movements at the onset of sleep - would like to rule out seizures, although not typical , other possibility may be sleep disorder\par \par - Would like to obtain 24 hour ambuatory EEG\par - Also obtain MRI brain\par \par # Migraine without aura, intractable, remarkable improvement with Propranalol\par \par - continue Propranalol 40 mg daily, is tolerating the dose well , monitor HR periodically\par - Advised to maintain HA diary

## 2021-05-21 NOTE — PHYSICAL EXAM
[General Appearance - Alert] : alert [General Appearance - In No Acute Distress] : in no acute distress [General Appearance - Well-Appearing] : healthy appearing [Oriented To Time, Place, And Person] : oriented to person, place, and time [Impaired Insight] : insight and judgment were intact [Affect] : the affect was normal [Person] : oriented to person [Place] : oriented to place [Time] : oriented to time [Registration Intact] : recent registration memory intact [Concentration Intact] : normal concentrating ability [Naming Objects] : no difficulty naming common objects [Repeating Phrases] : no difficulty repeating a phrase [Fluency] : fluency intact [Comprehension] : comprehension intact [Past History] : adequate knowledge of personal past history [Cranial Nerves Optic (II)] : visual acuity intact bilaterally,  visual fields full to confrontation, pupils equal round and reactive to light [Cranial Nerves Oculomotor (III)] : extraocular motion intact [Cranial Nerves Trigeminal (V)] : facial sensation intact symmetrically [Cranial Nerves Facial (VII)] : face symmetrical [Cranial Nerves Vestibulocochlear (VIII)] : hearing was intact bilaterally [Cranial Nerves Glossopharyngeal (IX)] : tongue and palate midline [Cranial Nerves Accessory (XI - Cranial And Spinal)] : head turning and shoulder shrug symmetric [Cranial Nerves Hypoglossal (XII)] : there was no tongue deviation with protrusion [Motor Tone] : muscle tone was normal in all four extremities [Motor Strength] : muscle strength was normal in all four extremities [No Muscle Atrophy] : normal bulk in all four extremities [Sensation Tactile Decrease] : light touch was intact [Abnormal Walk] : normal gait [Balance] : balance was intact [2+] : Ankle jerk left 2+ [PERRL With Normal Accommodation] : pupils were equal in size, round, reactive to light, with normal accommodation [Extraocular Movements] : extraocular movements were intact [Full Visual Field] : full visual field [Outer Ear] : the ears and nose were normal in appearance [Hearing Threshold Finger Rub Not [place]] : hearing was normal [Neck Cervical Mass (___cm)] : no neck mass was observed [Auscultation Breath Sounds / Voice Sounds] : lungs were clear to auscultation bilaterally [Heart Rate And Rhythm] : heart rate was normal and rhythm regular [Heart Sounds] : normal S1 and S2 [Arterial Pulses Carotid] : carotid pulses were normal with no bruits [] : no rash [Edema] : there was no peripheral edema [Past-pointing] : there was no past-pointing [Tremor] : no tremor present [Coordination - Dysmetria Impaired Finger-to-Nose Bilateral] : not present [Plantar Reflex Right Only] : normal on the right [Plantar Reflex Left Only] : normal on the left [Papilledema Of Both Eyes] : no papilledema

## 2021-05-21 NOTE — REASON FOR VISIT
[Consultation] : a consultation visit [FreeTextEntry1] : consultation for evaluation of involuntary shaking episodes

## 2021-06-22 ENCOUNTER — APPOINTMENT (OUTPATIENT)
Dept: RHEUMATOLOGY | Facility: CLINIC | Age: 26
End: 2021-06-22

## 2021-07-26 ENCOUNTER — APPOINTMENT (OUTPATIENT)
Dept: NEUROLOGY | Facility: CLINIC | Age: 26
End: 2021-07-26

## 2021-08-05 ENCOUNTER — APPOINTMENT (OUTPATIENT)
Dept: NEUROLOGY | Facility: CLINIC | Age: 26
End: 2021-08-05

## 2021-09-28 NOTE — ED PROVIDER NOTE - DOMESTIC TRAVEL HIGH RISK QUESTION
No Render In Strict Bullet Format?: No Continue Regimen: -\\n- Ketoconazole: Use as scalp wash for QD until improved, then BIW-TIW for maintenance. Let sit 2-5 minutes prior to rinsing. Detail Level: Zone Initiate Treatment: -\\n- Fluocinonide solution: Apply to affected areas twice daily for 2 weeks on, 1 week off. Repeat prn for flares. Avoid face/groin/armpits.

## 2021-11-09 ENCOUNTER — TRANSCRIPTION ENCOUNTER (OUTPATIENT)
Age: 26
End: 2021-11-09

## 2021-12-17 ENCOUNTER — APPOINTMENT (OUTPATIENT)
Dept: RHEUMATOLOGY | Facility: CLINIC | Age: 26
End: 2021-12-17

## 2022-04-13 ENCOUNTER — APPOINTMENT (OUTPATIENT)
Dept: RHEUMATOLOGY | Facility: CLINIC | Age: 27
End: 2022-04-13

## 2022-04-19 ENCOUNTER — APPOINTMENT (OUTPATIENT)
Dept: RHEUMATOLOGY | Facility: CLINIC | Age: 27
End: 2022-04-19
Payer: MEDICAID

## 2022-04-19 VITALS
OXYGEN SATURATION: 98 % | HEART RATE: 63 BPM | SYSTOLIC BLOOD PRESSURE: 106 MMHG | DIASTOLIC BLOOD PRESSURE: 72 MMHG | RESPIRATION RATE: 17 BRPM | TEMPERATURE: 98 F | HEIGHT: 66 IN

## 2022-04-19 PROCEDURE — 99213 OFFICE O/P EST LOW 20 MIN: CPT

## 2022-04-19 RX ORDER — CELECOXIB 200 MG/1
200 CAPSULE ORAL DAILY
Qty: 30 | Refills: 3 | Status: DISCONTINUED | COMMUNITY
Start: 2021-02-23 | End: 2022-04-19

## 2022-04-19 RX ORDER — PROPRANOLOL HYDROCHLORIDE 40 MG/1
40 TABLET ORAL
Qty: 30 | Refills: 5 | Status: DISCONTINUED | COMMUNITY
Start: 2020-05-15 | End: 2022-04-19

## 2022-04-19 RX ORDER — DULOXETINE HYDROCHLORIDE 60 MG/1
60 CAPSULE, DELAYED RELEASE PELLETS ORAL
Qty: 30 | Refills: 3 | Status: DISCONTINUED | COMMUNITY
Start: 2020-07-28 | End: 2022-04-19

## 2022-04-19 NOTE — ASSESSMENT
[FreeTextEntry1] : Fibromyalgia\par \par Doing very well. Would like to stop all medications\par \par - taper duloxetine to 20mg- take every other day for 2 weeks and the stop\par \par - f/u as needed\par \par Discussed treatment plan with the patient. The patient was given the opportunity to ask questions and all questions were answered to their satisfaction.

## 2022-04-19 NOTE — HISTORY OF PRESENT ILLNESS
[FreeTextEntry1] : Pt presenting today for a f.u visit.\par Last seen 02/2021 and then lost to follow up. \par Reports feeling well overall. No complaints. Would like to wean herself off duloxetine. Currently taking duloxetine 20mg daily.

## 2022-04-19 NOTE — PHYSICAL EXAM
[General Appearance - In No Acute Distress] : in no acute distress [General Appearance - Alert] : alert [General Appearance - Well Nourished] : well nourished [General Appearance - Well Developed] : well developed [Sclera] : the sclera and conjunctiva were normal [PERRL With Normal Accommodation] : pupils were equal in size, round, and reactive to light [Outer Ear] : the ears and nose were normal in appearance [Examination Of The Oral Cavity] : the lips and gums were normal [Both Tympanic Membranes Were Examined] : both tympanic membranes were normal [Oropharynx] : the oropharynx was normal [Neck Appearance] : the appearance of the neck was normal [Auscultation Breath Sounds / Voice Sounds] : lungs were clear to auscultation bilaterally [Heart Rate And Rhythm] : heart rate was normal and rhythm regular [Heart Sounds] : normal S1 and S2 [Heart Sounds Gallop] : no gallops [Murmurs] : no murmurs [Heart Sounds Pericardial Friction Rub] : no pericardial rub [Bowel Sounds] : normal bowel sounds [Abdomen Soft] : soft [Abdomen Tenderness] : non-tender [No CVA Tenderness] : no ~M costovertebral angle tenderness [No Spinal Tenderness] : no spinal tenderness [Nail Clubbing] : no clubbing  or cyanosis of the fingernails [Abnormal Walk] : normal gait [Musculoskeletal - Swelling] : no joint swelling seen [Motor Tone] : muscle strength and tone were normal [] : no rash [Skin Lesions] : no skin lesions [No Focal Deficits] : no focal deficits [Oriented To Time, Place, And Person] : oriented to person, place, and time [FreeTextEntry1] : +diffuse paraspinal tenderness

## 2022-06-10 ENCOUNTER — APPOINTMENT (OUTPATIENT)
Dept: RHEUMATOLOGY | Facility: CLINIC | Age: 27
End: 2022-06-10

## 2022-06-10 VITALS
TEMPERATURE: 98 F | SYSTOLIC BLOOD PRESSURE: 120 MMHG | OXYGEN SATURATION: 97 % | DIASTOLIC BLOOD PRESSURE: 70 MMHG | RESPIRATION RATE: 17 BRPM | WEIGHT: 171 LBS | BODY MASS INDEX: 27.48 KG/M2 | HEIGHT: 66 IN | HEART RATE: 78 BPM

## 2022-06-10 DIAGNOSIS — F19.239 OTHER PSYCHOACTIVE SUBSTANCE DEPENDENCE WITH WITHDRAWAL, UNSPECIFIED: ICD-10-CM

## 2022-06-10 PROCEDURE — 99214 OFFICE O/P EST MOD 30 MIN: CPT

## 2022-06-23 ENCOUNTER — EMERGENCY (EMERGENCY)
Facility: HOSPITAL | Age: 27
LOS: 1 days | Discharge: HOME | End: 2022-06-23
Attending: EMERGENCY MEDICINE
Payer: COMMERCIAL

## 2022-06-23 VITALS
HEART RATE: 109 BPM | RESPIRATION RATE: 18 BRPM | SYSTOLIC BLOOD PRESSURE: 133 MMHG | HEIGHT: 66 IN | TEMPERATURE: 99 F | OXYGEN SATURATION: 98 % | DIASTOLIC BLOOD PRESSURE: 84 MMHG | WEIGHT: 164.91 LBS

## 2022-06-23 PROCEDURE — 93971 EXTREMITY STUDY: CPT

## 2022-06-23 PROCEDURE — 99284 EMERGENCY DEPT VISIT MOD MDM: CPT

## 2022-06-23 PROCEDURE — 73610 X-RAY EXAM OF ANKLE: CPT | Mod: 26,RT

## 2022-06-23 PROCEDURE — 73610 X-RAY EXAM OF ANKLE: CPT

## 2022-06-23 PROCEDURE — 99284 EMERGENCY DEPT VISIT MOD MDM: CPT | Mod: 25

## 2022-06-23 NOTE — ED ADULT NURSE NOTE - CHIEF COMPLAINT
The patient is a 26y Female complaining of lower leg pain/injury.
The patient is a 26y Female complaining of

## 2022-06-23 NOTE — ED PROVIDER NOTE - OBJECTIVE STATEMENT
25 y/o F hx of fibromyalgia presents w/ R calf pain for the past 1 day. states she has been training for a marathon and has been running/exercising a lot. endorses pain in R calf and in the R ankle. is on kyleena IUD. denies trauma to the region. does endorse family hx of DVTs, has had ultrasounds of the RLE in the past before. Denies fever/chills. Denies falls. Denies chest pain/sob.

## 2022-06-23 NOTE — ED PROVIDER NOTE - NSFOLLOWUPINSTRUCTIONS_ED_ALL_ED_FT
Follow up with PCP within 1 week    you have been provided a copy of all of your results   take tylenol every 6 hours for pain   warm compresses     return if new or worsening symptoms

## 2022-06-23 NOTE — ED ADULT TRIAGE NOTE - CHIEF COMPLAINT QUOTE
patient ambulated into ER complaining of right LE pain from calf to ankle denies injury pain for 2 days constant

## 2022-06-23 NOTE — ED PROVIDER NOTE - CLINICAL SUMMARY MEDICAL DECISION MAKING FREE TEXT BOX
27 y/o F hx of fibromyalgia presents w/ R calf pain for the past 1 day. states she has been training for a marathon and has been running/exercising a lot. endorses pain in R calf and in the R ankle  RLE does not appear erythematous or swollen relative to the LLE. tender over distal calf region. pain w/ external rotation of R foot/ankle.  family hx of DVT, on progestin IUD. will get duplex RLE to r/o DVT. xray to r/o fx. patient declining pain meds at this time.

## 2022-06-23 NOTE — ED PROVIDER NOTE - PHYSICAL EXAMINATION
General: Well appearing female in no acute distress  HEENT: Normocephalic, atraumatic. Moist mucous membranes. Oropharynx clear. No lymphadenopathy.  Eyes: No scleral icterus. EOMI. VIKKI.  Neck:. Soft and supple. Full ROM without pain. No midline tenderness  Cardiac: Regular rate and regular rhythm. No murmurs, rubs, gallops. Peripheral pulses 2+ and symmetric. No LE edema.  Resp: Lungs CTAB. Speaking in full sentences. No wheezes, rales or rhonchi.  Abd: Soft, non-tender, non-distended. No guarding or rebound. No scars, masses, or lesions.  Back: Spine midline and non-tender. No CVA tenderness.    Skin: No rashes, abrasions, or lacerations.  Neuro: AO x 3. Moves all extremities symmetrically. Motor strength and sensation grossly intact.  MSK: RLE does not appear erythematous or swollen relative to the LLE. tender over distal calf region. pain w/ external rotation of R foot/ankle.

## 2022-06-23 NOTE — ED ADULT NURSE NOTE - OBJECTIVE STATEMENT
c/o RLE calf pain. training for a 1/2 marathon. denies trauma/falls. denies CP/SOB. has kyleena IUD. + family of dvt post surgical.

## 2022-06-23 NOTE — ED PROVIDER NOTE - PATIENT PORTAL LINK FT
You can access the FollowMyHealth Patient Portal offered by [facility] by registering at the following website: [email or web address]. By joining [employer]’s FollowMyHealth portal, you will also be able to view your health information using other applications (apps) compatible with our system.

## 2022-06-23 NOTE — ED PROVIDER NOTE - ATTENDING CONTRIBUTION TO CARE
Pt with RLE pain, training for 1/2 marathon, worse when on treadmill today.  Family hx of blood clotting, but no other high level risk factors.  no cp/sob  plan xray and US

## 2022-06-23 NOTE — ED PROVIDER NOTE - NSFOLLOWUPCLINICS_GEN_ALL_ED_FT
[facility] General [facility]oped[facility]  Orthopedics  [address] [place] [id number]  Phone: [phone]  Fax:

## 2022-06-23 NOTE — ED ADULT NURSE REASSESSMENT NOTE - NS ED NURSE REASSESS COMMENT FT1
Patient eloped with possible IV in place. Multiple attempts to call patient with no answer. Contacted patients mother who also attempted to call patient with no results. Notified MD and charge nurse of patient. Call to [facility] and made aware of patient eloping and will be sending a unit to patients residents

## 2022-06-23 NOTE — ED ADULT NURSE NOTE - CHIEF COMPLAINT QUOTE
patient ambulated into ER complaining of right LE pain from calf to ankle denies injury pain for 2 days constant
patient ambulated into ER complaining of right LE pain from calf to ankle denies injury pain for 2 days constant

## 2022-06-24 PROCEDURE — 93971 EXTREMITY STUDY: CPT | Mod: 26,RT

## 2022-07-05 PROBLEM — F19.239 WITHDRAWAL SYNDROME: Status: ACTIVE | Noted: 2022-06-10

## 2022-07-05 NOTE — HISTORY OF PRESENT ILLNESS
[FreeTextEntry1] : Pt presenting today for a f.u visit.\par LV reports feeling well overall. No complaints. Would like to wean herself off duloxetine. \par She weaned off duloxetine over the course of 2 weeks. \par Now presenting with withdrawal symptoms. \par No other current complaints. \par Will restart duloxetine 20mg daily and plan for slow, extended taper off 4 weeks.

## 2022-07-05 NOTE — ASSESSMENT
[FreeTextEntry1] : Fibromyalgia. Currently asymptomatic.  Would like to stop all medications\par s/p taper off duloxetine over 2 weeks\par \par Now presenting with withdrawal symptoms\par \par - slow, extended taper off duloxetine over the course of 4 weeks\par - if has recurrence of symptoms/ withdrawal would need psychiatry guidance for taper\par \par - f/u as needed\par \par Discussed treatment plan with the patient. The patient was given the opportunity to ask questions and all questions were answered to their satisfaction.

## 2022-07-05 NOTE — PHYSICAL EXAM
[General Appearance - Alert] : alert [General Appearance - In No Acute Distress] : in no acute distress [General Appearance - Well Nourished] : well nourished [General Appearance - Well Developed] : well developed [Sclera] : the sclera and conjunctiva were normal [PERRL With Normal Accommodation] : pupils were equal in size, round, and reactive to light [Outer Ear] : the ears and nose were normal in appearance [Examination Of The Oral Cavity] : the lips and gums were normal [Both Tympanic Membranes Were Examined] : both tympanic membranes were normal [Oropharynx] : the oropharynx was normal [Neck Appearance] : the appearance of the neck was normal [Auscultation Breath Sounds / Voice Sounds] : lungs were clear to auscultation bilaterally [Heart Rate And Rhythm] : heart rate was normal and rhythm regular [Heart Sounds] : normal S1 and S2 [Heart Sounds Gallop] : no gallops [Murmurs] : no murmurs [Heart Sounds Pericardial Friction Rub] : no pericardial rub [Bowel Sounds] : normal bowel sounds [Abdomen Soft] : soft [Abdomen Tenderness] : non-tender [No CVA Tenderness] : no ~M costovertebral angle tenderness [No Spinal Tenderness] : no spinal tenderness [Abnormal Walk] : normal gait [Nail Clubbing] : no clubbing  or cyanosis of the fingernails [Musculoskeletal - Swelling] : no joint swelling seen [Motor Tone] : muscle strength and tone were normal [] : no rash [Skin Lesions] : no skin lesions [No Focal Deficits] : no focal deficits [Oriented To Time, Place, And Person] : oriented to person, place, and time [FreeTextEntry1] : +diffuse paraspinal tenderness

## 2022-09-28 ENCOUNTER — APPOINTMENT (OUTPATIENT)
Dept: ORTHOPEDIC SURGERY | Facility: CLINIC | Age: 27
End: 2022-09-28

## 2022-09-28 VITALS
HEART RATE: 75 BPM | HEIGHT: 65 IN | WEIGHT: 175 LBS | TEMPERATURE: 98.1 F | BODY MASS INDEX: 29.16 KG/M2 | SYSTOLIC BLOOD PRESSURE: 121 MMHG | DIASTOLIC BLOOD PRESSURE: 75 MMHG

## 2022-09-28 PROCEDURE — 99203 OFFICE O/P NEW LOW 30 MIN: CPT | Mod: 57

## 2022-09-28 PROCEDURE — 73560 X-RAY EXAM OF KNEE 1 OR 2: CPT | Mod: RT

## 2022-09-28 PROCEDURE — 27530 TREAT KNEE FRACTURE: CPT | Mod: RT

## 2022-09-28 NOTE — PHYSICAL EXAM
[de-identified] : Physical Exam:\par General: Well appearing, no acute distress, A&O\par Neurologic: A&Ox3, No focal deficits\par Head: NCAT without abrasions, lacerations, or ecchymosis to head, face, or scalp\par Respiratory: Equal chest wall expansion bilaterally, no accessory muscle use\par Lymphatic: No lymphadenopathy palpated\par Skin: Warm and dry\par Psychiatric: Normal mood and affect\par \par RLE:\par SILT s/s/sp/dp/t\par Fires EHL/FHL/GS/TA\par 2+ DP/PT pulse\par brisk capillary refill\par Positive tenderness to palpation over the medial joint line\par Positive pain with knee extension\par Range of motion from 10 to 90 degrees limited by pain [de-identified] : 2 view plain films of the right knee were obtained today and compared to previous CT scan.  There is a minimally displaced intra-articular medial plateau fracture with early healing.

## 2022-09-28 NOTE — HISTORY OF PRESENT ILLNESS
[de-identified] : The patient is a very pleasant 26-year-old female who presents with a family member today for evaluation of right knee pain.  She suffered a fall on September 3 when trying to get to a train.  She felt a pop in her knee.  She went to urgent care but no fracture was diagnosed.  She subsequently followed up with Dr. Childs and a CT was performed who referred her to come see me.  She is ambulating with crutches and a range of motion brace.  She has been nonweightbearing.  The patient states the pain is made worse with activity and relieved with rest.  [3] : a current pain level of 3/10 [Bending] : worsened by bending [Lifting] : worsened by lifting [Weight Bearing] : worsened by weight bearing [Recumbency] : relieved by recumbency [Rest] : relieved by rest [de-identified] : Aching

## 2022-09-28 NOTE — DISCUSSION/SUMMARY
[de-identified] : 26-year-old female with right minimally displaced tibial plateau fracture 4 weeks old.  We discussed that even on CAT scan there is minimal displacement and that surgical intervention would be difficult to improve the minimal displacement currently seen.  She does have slightly increased risk of knee pain and posttraumatic arthritis secondary to the injury but I do not feel that surgery would likely improve the minimal displacement.  I would recommend she stay nonweightbearing for a total of 6 weeks and then slowly advance to weightbearing and wean out of the brace as well.  She will come back and see us in a month with repeat x-rays to ensure no further displacement has occurred.\par \par The question of when to drive is impossible to generalize to everyone because it is largely dependent on the individual.  Importantly, doctors do not have a license with the DMV to "clear you" or "release you" to return to driving.  There are 3 primary criteria that must be met.  You need to be off of narcotic pain medicines (otherwise you are driving under the influence).  You need to be able to get in and out of the 's seat comfortably.  And you must have regained your normal reflexes / strength.  Also, return to driving depends partly on what side had surgery (ie. Right leg operates the pedals; people with Left side surgery can generally get back to driving much sooner unless you have a clutch).  The average time to return to driving is around 2 weeks after you return to normal walking for the right side and usually sooner for the left.  We recommend 'testing' yourself with another licensed  in an empty parking lot or quiet street first in order to check your reflexes moving your foot from pedal to pedal.\par \par The patient was given the opportunity to ask questions and all questions were answered to their satisfaction.\par \par Jose Davis MD\par Orthopaedic Trauma Surgeon\par [facility]\par [facility] [place]\par Director Orthopaedic Trauma, [facility]\par \par \par \par

## 2022-09-28 NOTE — REASON FOR VISIT
[Initial Visit] : an initial visit for [Tibia Fracture] : tibia fracture [Parent] : parent [FreeTextEntry2] : right knee pain

## 2022-10-19 ENCOUNTER — APPOINTMENT (OUTPATIENT)
Dept: ORTHOPEDIC SURGERY | Facility: CLINIC | Age: 27
End: 2022-10-19

## 2022-10-19 VITALS
BODY MASS INDEX: 29.16 KG/M2 | HEIGHT: 65 IN | DIASTOLIC BLOOD PRESSURE: 80 MMHG | HEART RATE: 104 BPM | WEIGHT: 175 LBS | SYSTOLIC BLOOD PRESSURE: 125 MMHG | TEMPERATURE: 97.9 F

## 2022-10-19 DIAGNOSIS — S82.131A DISPLACED FRACTURE OF MEDIAL CONDYLE OF RIGHT TIBIA, INITIAL ENCOUNTER FOR CLOSED FRACTURE: ICD-10-CM

## 2022-10-19 PROCEDURE — 99024 POSTOP FOLLOW-UP VISIT: CPT

## 2022-10-19 PROCEDURE — 73560 X-RAY EXAM OF KNEE 1 OR 2: CPT | Mod: RT

## 2022-10-19 NOTE — HISTORY OF PRESENT ILLNESS
[Worsening] : worsening [___ wks] : [unfilled] week(s) ago [5] : a current pain level of 5/10 [Standing] : standing [Walking] : worsened by walking [Weight Bearing] : worsened by weight bearing [Recumbency] : relieved by recumbency [Rest] : relieved by rest [de-identified] : Patient is a 26 year old female who presents to the office today for repeat evaluation of her right knee. She reports that she was doing well up until 2 weeks ago when she sustained another injury to her R knee while walking. She states since the recent injury she has had significant pain and inability to bear any weight with crutches in her RLE. Denies any numbness or tingling at rest. Denies any other acute orthopedic concerns at this time.  [Bending] : worsened by bending [Lifting] : worsened by lifting [de-identified] : aching

## 2022-10-19 NOTE — PHYSICAL EXAM
[de-identified] : Physical Exam:\par General: Well appearing, no acute distress, A&O\par Neurologic: A&Ox3, No focal deficits\par Head: NCAT without abrasions, lacerations, or ecchymosis to head, face, or scalp\par Respiratory: Equal chest wall expansion bilaterally, no accessory muscle use\par Lymphatic: No lymphadenopathy palpated\par Skin: Warm and dry\par Psychiatric: Normal mood and affect \par \par Right Lower Extremity:\par Skin intact without erythema \par No palpable effusion\par +ttp over medial tibial plateau\par No other bony tenderness to palpation\par +EHL/FHL/TA/GSc\par SILT C5-T1\par Able to SLR but lacks terminal extension 2/2 to pain\par Compartments soft and compressible [de-identified] : XR R Knee (2 views, 10/19/22): Re-demonstration of depressed medial plateau fracture with apparent worsening of depression as compared to previous XR imaging. No other obvious acute fractures or dislocations.

## 2022-10-19 NOTE — DISCUSSION/SUMMARY
[de-identified] : Patient is a 26F with a R Depressed Medial Plateau Fracture. We discussed that due to the mechanism of her recent injury 2 weeks ago and the apparent changes in the radiographic images, a CT of her knee to reassess the fracture and amount of depression would be warranted at this time. Once the CT has been performed, a phone call discussion will be held regarding the need for operative intervention. The patient was instructed to remain non-weightbearing to her RLE with crutches for ambulation. The condition, natural history, and prognosis were explained to the patient and family. The clinical findings and images were reviewed with the patient.All questions and concerns were addressed during today's visit. The patient verbalized an understanding and are in agreement with the above plan.\par \par Orthopaedic Trauma Surgeon Addendum:\par \par I agree with the above resident physician note.  \par Chart was reviewed and patient examined in the orthopedic office. I agree with the assessment and plan of the resident.\par \par I was physically present for the key portions of the evaluation and management service provided. I agree with the above history, physical and plan. Appropriate imaging has been reviewed and the plan adjusted as needed.\par \par Jose Davis MD\par Orthopaedic Trauma Surgeon\par [facility]\par [facility] [place]\par

## 2022-10-26 ENCOUNTER — OUTPATIENT (OUTPATIENT)
Dept: OUTPATIENT SERVICES | Facility: HOSPITAL | Age: 27
LOS: 1 days | End: 2022-10-26
Payer: MEDICAID

## 2022-10-26 ENCOUNTER — APPOINTMENT (OUTPATIENT)
Dept: CT IMAGING | Facility: CLINIC | Age: 27
End: 2022-10-26

## 2022-10-26 DIAGNOSIS — S82.131A DISPLACED FRACTURE OF MEDIAL CONDYLE OF RIGHT TIBIA, INITIAL ENCOUNTER FOR CLOSED FRACTURE: ICD-10-CM

## 2022-10-26 PROCEDURE — 73700 CT LOWER EXTREMITY W/O DYE: CPT

## 2022-10-26 PROCEDURE — 73700 CT LOWER EXTREMITY W/O DYE: CPT | Mod: 26,RT

## 2022-11-16 ENCOUNTER — APPOINTMENT (OUTPATIENT)
Dept: ORTHOPEDIC SURGERY | Facility: CLINIC | Age: 27
End: 2022-11-16

## 2022-12-21 ENCOUNTER — APPOINTMENT (OUTPATIENT)
Dept: ORTHOPEDIC SURGERY | Facility: CLINIC | Age: 27
End: 2022-12-21
Payer: MEDICAID

## 2022-12-21 VITALS — HEIGHT: 65 IN | WEIGHT: 175 LBS | BODY MASS INDEX: 29.16 KG/M2

## 2022-12-21 DIAGNOSIS — M25.561 PAIN IN RIGHT KNEE: ICD-10-CM

## 2022-12-21 PROCEDURE — 73560 X-RAY EXAM OF KNEE 1 OR 2: CPT | Mod: RT

## 2022-12-21 PROCEDURE — 99214 OFFICE O/P EST MOD 30 MIN: CPT

## 2022-12-21 NOTE — DISCUSSION/SUMMARY
[de-identified] : Patient is a 26F with a Right Depressed Medial Plateau Fracture and new concern for ligamentous or cartilage injury.  Due to the catching and popping, I am concerned that she may have suffered a worsening of a cartilage flap or meniscus injury.  I would recommend an MRI as she is already failed physical therapy.  When the MRI is complete, she will give us a call. \par \par Jose Davis MD\par Orthopaedic Trauma Surgeon\par [facility]\par [facility]opa[facility] [place]\par

## 2022-12-21 NOTE — HISTORY OF PRESENT ILLNESS
[de-identified] : The patient is a very pleasant 26-year-old female who presents with a family member today for follow-up evaluation of right knee pain.  She suffered a fall on September 3 when trying to get to a train.  She felt a pop in her knee.  She went to urgent care but no fracture was diagnosed.  She subsequently followed up with Dr. Childs and a CT was performed who referred her to come see me.  \par \par She had been doing better with physical therapy.  The fracture has not shown any displacement in her previous visits.  However lately she has been having increasing pain in the knee and feelings of mechanical symptoms with popping and catching with certain knee motion.  She feels that she is regressing in physical therapy and her physical therapist is worried there may be a ligamentous or cartilage injury in the knee.  The patient states the pain is made worse with activity and relieved with rest.  Catching, 5 out of 10 [Bending] : worsened by bending [Lifting] : worsened by lifting [Weight Bearing] : worsened by weight bearing [Recumbency] : relieved by recumbency [Rest] : relieved by rest

## 2022-12-21 NOTE — PHYSICAL EXAM
[de-identified] : Physical Exam:\par General: Well appearing, no acute distress, A&O\par Neurologic: A&Ox3, No focal deficits\par Head: NCAT without abrasions, lacerations, or ecchymosis to head, face, or scalp\par Respiratory: Equal chest wall expansion bilaterally, no accessory muscle use\par Lymphatic: No lymphadenopathy palpated\par Skin: Warm and dry\par Psychiatric: Normal mood and affect\par \par RLE:\par SILT s/s/sp/dp/t\par Fires EHL/FHL/GS/TA\par 2+ DP/PT pulse\par brisk capillary refill\par Positive tenderness to palpation over the medial joint line\par Positive pain with knee extension\par Range of motion from 10 to 90 degrees limited by pain [de-identified] : XR R Knee (2 views, 12/21/22): Re-demonstration of depressed medial plateau fracture with no significant changes. No other obvious acute fractures or dislocations.

## 2023-01-09 ENCOUNTER — OUTPATIENT (OUTPATIENT)
Dept: OUTPATIENT SERVICES | Facility: HOSPITAL | Age: 28
LOS: 1 days | End: 2023-01-09
Payer: MEDICAID

## 2023-01-09 ENCOUNTER — APPOINTMENT (OUTPATIENT)
Dept: MRI IMAGING | Facility: CLINIC | Age: 28
End: 2023-01-09

## 2023-01-09 DIAGNOSIS — S82.131A DISPLACED FRACTURE OF MEDIAL CONDYLE OF RIGHT TIBIA, INITIAL ENCOUNTER FOR CLOSED FRACTURE: ICD-10-CM

## 2023-01-09 PROCEDURE — 73721 MRI JNT OF LWR EXTRE W/O DYE: CPT | Mod: 26,RT

## 2023-01-24 ENCOUNTER — OFFICE (OUTPATIENT)
Dept: URBAN - METROPOLITAN AREA CLINIC 12 | Facility: CLINIC | Age: 28
Setting detail: OPHTHALMOLOGY
End: 2023-01-24
Payer: COMMERCIAL

## 2023-01-24 ENCOUNTER — RX ONLY (RX ONLY)
Age: 28
End: 2023-01-24

## 2023-01-24 DIAGNOSIS — H52.13: ICD-10-CM

## 2023-01-24 PROCEDURE — SCREF LASIK EVAL: Performed by: OPHTHALMOLOGY

## 2023-01-24 ASSESSMENT — SPHEQUIV_DERIVED
OD_SPHEQUIV: -2.995
OS_SPHEQUIV: -3.25
OD_SPHEQUIV: 0.125
OS_SPHEQUIV: 0.625
OS_SPHEQUIV: -3.25

## 2023-01-24 ASSESSMENT — REFRACTION_CURRENTRX
OD_AXIS: 0
OS_SPHERE: -3.00
OD_SPHERE: -3.00
OD_CYLINDER: SPHERE
OD_OVR_VA: 20/
OD_VPRISM_DIRECTION: SV
OS_AXIS: 004
OS_VPRISM_DIRECTION: SV
OS_OVR_VA: 20/
OS_CYLINDER: -0.50

## 2023-01-24 ASSESSMENT — KERATOMETRY
OD_K1POWER_DIOPTERS: 40.00
OD_AXISANGLE_DEGREES: 73
OS_AXISANGLE_DEGREES: 161
OS_K2POWER_DIOPTERS: 40.00
OD_K2POWER_DIOPTERS: 40.25
OS_K1POWER_DIOPTERS: 39.75

## 2023-01-24 ASSESSMENT — REFRACTION_MANIFEST
OD_SPHERE: -2.87
OD_AXIS: 015
OS_AXIS: 005
OD_SPHERE: -3.00
OD_VA1: 20/20
OD_CYLINDER: -0.25
OD_VA1: 20/20-1
OS_SPHERE: -3.00
OS_VA1: 20/20-1
OS_CYLINDER: -0.50
OS_AXIS: 005
OS_CYLINDER: -0.50
OS_SPHERE: -3.00
OS_VA1: 20/20
OD_CYLINDER: SPHERE
OD_AXIS: 0

## 2023-01-24 ASSESSMENT — AXIALLENGTH_DERIVED
OD_AL: 26.27
OS_AL: 24.7325
OS_AL: 26.511
OD_AL: 24.8462
OS_AL: 26.511

## 2023-01-24 ASSESSMENT — CONFRONTATIONAL VISUAL FIELD TEST (CVF)
OS_FINDINGS: FULL
OD_FINDINGS: FULL

## 2023-01-24 ASSESSMENT — TONOMETRY
OD_IOP_MMHG: 16
OS_IOP_MMHG: 17

## 2023-01-24 ASSESSMENT — REFRACTION_AUTOREFRACTION
OS_SPHERE: +0.75
OS_CYLINDER: -0.25
OD_CYLINDER: -0.25
OS_AXIS: 115
OD_AXIS: 10
OD_SPHERE: +0.25

## 2023-01-24 ASSESSMENT — VISUAL ACUITY
OS_BCVA: 20/20
OD_BCVA: 20/20

## 2023-05-30 ENCOUNTER — APPOINTMENT (OUTPATIENT)
Dept: RHEUMATOLOGY | Facility: CLINIC | Age: 28
End: 2023-05-30
Payer: MEDICAID

## 2023-05-30 VITALS
DIASTOLIC BLOOD PRESSURE: 80 MMHG | WEIGHT: 185 LBS | HEIGHT: 65 IN | TEMPERATURE: 98.3 F | RESPIRATION RATE: 17 BRPM | BODY MASS INDEX: 30.82 KG/M2 | HEART RATE: 99 BPM | OXYGEN SATURATION: 98 % | SYSTOLIC BLOOD PRESSURE: 124 MMHG

## 2023-05-30 DIAGNOSIS — R53.83 OTHER FATIGUE: ICD-10-CM

## 2023-05-30 PROCEDURE — 99215 OFFICE O/P EST HI 40 MIN: CPT

## 2023-05-30 NOTE — HISTORY OF PRESENT ILLNESS
[FreeTextEntry1] : Pt presenting today for a f.u visit for fibromyalgia. Last seen 06/2022. Chart reviewed since LV. \par Reports feeling well overall. No complaints. \par Recent labs from 05/2023 reviewed- ROMEO/dsdna: WNL. Labs otherwise unremarkable. \par On low dose duloxetine 20mg daily- renewed by PCP. Tolerating medication well. Denies side effects.\par No significant joint pain, swelling, stiffness to the joints. Feels stable with duloxetine. \par Denies fever, chills, oral/nasal ulcers, CP, SOB, abd pain, rashes. No falls.

## 2023-05-30 NOTE — ASSESSMENT
[FreeTextEntry1] : Fibromyalgia. Currently doing well on low dose duloxetine\par \par Recent labs- WNL\par \par - encouraged proper diet, good sleep hygiene, exercise \par - c/w duloxetine 20mg daily  (reviewed risk and benefits of medications)\par \par Discussed treatment plan with the patient. The patient was given the opportunity to ask questions and all questions were answered to their satisfaction.

## 2023-09-21 NOTE — ED STATDOCS - ATTENDING CONTRIBUTION TO CARE
Received pt in results waiting. Pt is lying in bed comfortably. Pt breathing is equal and nonlabored. Pt denies ay pain or discomfort. Pt waiting for CT. Pt safety maintained.
I performed the initial face to face bedside interview with this patient regarding history of present illness, review of symptoms and past medical, social and family history.  I completed an independent physical examination.  I was the initial provider who evaluated this patient.  The history, review of symptoms and examination was documented by the scribe in my presence and I attest to the accuracy of the documentation.  I have signed out the follow up of any pending tests (i.e. labs, radiological studies) to the ACP.  I have discussed the patient’s plan of care and disposition with the ACP.

## 2023-10-16 ENCOUNTER — APPOINTMENT (OUTPATIENT)
Dept: RHEUMATOLOGY | Facility: CLINIC | Age: 28
End: 2023-10-16
Payer: MEDICAID

## 2023-10-16 VITALS
RESPIRATION RATE: 17 BRPM | HEART RATE: 107 BPM | OXYGEN SATURATION: 98 % | TEMPERATURE: 98.1 F | SYSTOLIC BLOOD PRESSURE: 114 MMHG | HEIGHT: 65 IN | DIASTOLIC BLOOD PRESSURE: 74 MMHG

## 2023-10-16 DIAGNOSIS — M79.641 PAIN IN RIGHT HAND: ICD-10-CM

## 2023-10-16 DIAGNOSIS — M25.50 PAIN IN UNSPECIFIED JOINT: ICD-10-CM

## 2023-10-16 DIAGNOSIS — M79.7 FIBROMYALGIA: ICD-10-CM

## 2023-10-16 DIAGNOSIS — M79.642 PAIN IN RIGHT HAND: ICD-10-CM

## 2023-10-16 DIAGNOSIS — M79.10 MYALGIA, UNSPECIFIED SITE: ICD-10-CM

## 2023-10-16 PROCEDURE — 99214 OFFICE O/P EST MOD 30 MIN: CPT

## 2023-10-16 RX ORDER — PNV NO.95/FERROUS FUM/FOLIC AC 28MG-0.8MG
TABLET ORAL
Refills: 0 | Status: ACTIVE | COMMUNITY

## 2023-10-16 RX ORDER — ONDANSETRON HYDROCHLORIDE 4 MG/1
4 TABLET, FILM COATED ORAL
Refills: 0 | Status: ACTIVE | COMMUNITY

## 2023-10-16 RX ORDER — METOCLOPRAMIDE HYDROCHLORIDE 10 MG/1
10 TABLET ORAL
Refills: 0 | Status: ACTIVE | COMMUNITY

## 2023-10-16 RX ORDER — ASPIRIN 81 MG
81 TABLET, DELAYED RELEASE (ENTERIC COATED) ORAL
Refills: 0 | Status: ACTIVE | COMMUNITY

## 2023-10-16 RX ORDER — DULOXETINE HYDROCHLORIDE 20 MG/1
20 CAPSULE, DELAYED RELEASE PELLETS ORAL
Qty: 30 | Refills: 2 | Status: ACTIVE | COMMUNITY
Start: 2022-04-19 | End: 1900-01-01

## 2024-01-31 ENCOUNTER — OUTPATIENT (OUTPATIENT)
Dept: OUTPATIENT SERVICES | Facility: HOSPITAL | Age: 29
LOS: 1 days | End: 2024-01-31
Payer: COMMERCIAL

## 2024-01-31 ENCOUNTER — EMERGENCY (EMERGENCY)
Facility: HOSPITAL | Age: 29
LOS: 1 days | Discharge: HOME | End: 2024-01-31
Attending: EMERGENCY MEDICINE
Payer: COMMERCIAL

## 2024-01-31 VITALS
OXYGEN SATURATION: 97 % | DIASTOLIC BLOOD PRESSURE: 78 MMHG | WEIGHT: 178.35 LBS | SYSTOLIC BLOOD PRESSURE: 129 MMHG | TEMPERATURE: 98 F | RESPIRATION RATE: 16 BRPM | HEART RATE: 129 BPM

## 2024-01-31 VITALS
OXYGEN SATURATION: 99 % | HEART RATE: 100 BPM | SYSTOLIC BLOOD PRESSURE: 109 MMHG | RESPIRATION RATE: 18 BRPM | TEMPERATURE: 98 F | DIASTOLIC BLOOD PRESSURE: 74 MMHG

## 2024-01-31 DIAGNOSIS — O26.899 OTHER SPECIFIED PREGNANCY RELATED CONDITIONS, UNSPECIFIED TRIMESTER: ICD-10-CM

## 2024-01-31 LAB
ALBUMIN SERPL ELPH-MCNC: 3.6 G/DL — SIGNIFICANT CHANGE UP (ref 3.3–5.2)
ALP SERPL-CCNC: 125 U/L — HIGH (ref 40–120)
ALT FLD-CCNC: 11 U/L — SIGNIFICANT CHANGE UP
ANION GAP SERPL CALC-SCNC: 15 MMOL/L — SIGNIFICANT CHANGE UP (ref 5–17)
ANISOCYTOSIS BLD QL: SLIGHT — SIGNIFICANT CHANGE UP
APPEARANCE UR: ABNORMAL
AST SERPL-CCNC: 15 U/L — SIGNIFICANT CHANGE UP
BACTERIA # UR AUTO: ABNORMAL /HPF
BASOPHILS # BLD AUTO: 0.15 K/UL — SIGNIFICANT CHANGE UP (ref 0–0.2)
BASOPHILS NFR BLD AUTO: 0.9 % — SIGNIFICANT CHANGE UP (ref 0–2)
BILIRUB SERPL-MCNC: <0.2 MG/DL — LOW (ref 0.4–2)
BILIRUB UR-MCNC: NEGATIVE — SIGNIFICANT CHANGE UP
BUN SERPL-MCNC: 10.4 MG/DL — SIGNIFICANT CHANGE UP (ref 8–20)
CALCIUM SERPL-MCNC: 8.8 MG/DL — SIGNIFICANT CHANGE UP (ref 8.4–10.5)
CAST: 0 /LPF — SIGNIFICANT CHANGE UP (ref 0–4)
CHLORIDE SERPL-SCNC: 100 MMOL/L — SIGNIFICANT CHANGE UP (ref 96–108)
CO2 SERPL-SCNC: 20 MMOL/L — LOW (ref 22–29)
COLOR SPEC: YELLOW — SIGNIFICANT CHANGE UP
CREAT SERPL-MCNC: 0.59 MG/DL — SIGNIFICANT CHANGE UP (ref 0.5–1.3)
DIFF PNL FLD: NEGATIVE — SIGNIFICANT CHANGE UP
EGFR: 127 ML/MIN/1.73M2 — SIGNIFICANT CHANGE UP
EOSINOPHIL # BLD AUTO: 0 K/UL — SIGNIFICANT CHANGE UP (ref 0–0.5)
EOSINOPHIL NFR BLD AUTO: 0 % — SIGNIFICANT CHANGE UP (ref 0–6)
GIANT PLATELETS BLD QL SMEAR: PRESENT — SIGNIFICANT CHANGE UP
GLUCOSE SERPL-MCNC: 107 MG/DL — HIGH (ref 70–99)
GLUCOSE UR QL: NEGATIVE MG/DL — SIGNIFICANT CHANGE UP
HCG SERPL-ACNC: HIGH MIU/ML
HCT VFR BLD CALC: 30.5 % — LOW (ref 34.5–45)
HGB BLD-MCNC: 9.9 G/DL — LOW (ref 11.5–15.5)
KETONES UR-MCNC: NEGATIVE MG/DL — SIGNIFICANT CHANGE UP
LEUKOCYTE ESTERASE UR-ACNC: ABNORMAL
LYMPHOCYTES # BLD AUTO: 1.6 K/UL — SIGNIFICANT CHANGE UP (ref 1–3.3)
LYMPHOCYTES # BLD AUTO: 9.8 % — LOW (ref 13–44)
MANUAL SMEAR VERIFICATION: SIGNIFICANT CHANGE UP
MCHC RBC-ENTMCNC: 26.6 PG — LOW (ref 27–34)
MCHC RBC-ENTMCNC: 32.5 GM/DL — SIGNIFICANT CHANGE UP (ref 32–36)
MCV RBC AUTO: 82 FL — SIGNIFICANT CHANGE UP (ref 80–100)
METAMYELOCYTES # FLD: 2.7 % — HIGH (ref 0–0)
MICROCYTES BLD QL: SLIGHT — SIGNIFICANT CHANGE UP
MONOCYTES # BLD AUTO: 1.01 K/UL — HIGH (ref 0–0.9)
MONOCYTES NFR BLD AUTO: 6.2 % — SIGNIFICANT CHANGE UP (ref 2–14)
NEUTROPHILS # BLD AUTO: 13 K/UL — HIGH (ref 1.8–7.4)
NEUTROPHILS NFR BLD AUTO: 78.6 % — HIGH (ref 43–77)
NEUTS BAND # BLD: 0.9 % — SIGNIFICANT CHANGE UP (ref 0–8)
NITRITE UR-MCNC: NEGATIVE — SIGNIFICANT CHANGE UP
OVALOCYTES BLD QL SMEAR: SLIGHT — SIGNIFICANT CHANGE UP
PH UR: 7.5 — SIGNIFICANT CHANGE UP (ref 5–8)
PLAT MORPH BLD: NORMAL — SIGNIFICANT CHANGE UP
PLATELET # BLD AUTO: 323 K/UL — SIGNIFICANT CHANGE UP (ref 150–400)
POIKILOCYTOSIS BLD QL AUTO: SLIGHT — SIGNIFICANT CHANGE UP
POLYCHROMASIA BLD QL SMEAR: SLIGHT — SIGNIFICANT CHANGE UP
POTASSIUM SERPL-MCNC: 3.5 MMOL/L — SIGNIFICANT CHANGE UP (ref 3.5–5.3)
POTASSIUM SERPL-SCNC: 3.5 MMOL/L — SIGNIFICANT CHANGE UP (ref 3.5–5.3)
PROMYELOCYTES # FLD: 0.9 % — HIGH (ref 0–0)
PROT SERPL-MCNC: 6.7 G/DL — SIGNIFICANT CHANGE UP (ref 6.6–8.7)
PROT UR-MCNC: NEGATIVE MG/DL — SIGNIFICANT CHANGE UP
RBC # BLD: 3.72 M/UL — LOW (ref 3.8–5.2)
RBC # FLD: 13.3 % — SIGNIFICANT CHANGE UP (ref 10.3–14.5)
RBC BLD AUTO: ABNORMAL
RBC CASTS # UR COMP ASSIST: 1 /HPF — SIGNIFICANT CHANGE UP (ref 0–4)
SODIUM SERPL-SCNC: 135 MMOL/L — SIGNIFICANT CHANGE UP (ref 135–145)
SP GR SPEC: 1.02 — SIGNIFICANT CHANGE UP (ref 1–1.03)
SQUAMOUS # UR AUTO: 18 /HPF — HIGH (ref 0–5)
UROBILINOGEN FLD QL: 1 MG/DL — SIGNIFICANT CHANGE UP (ref 0.2–1)
WBC # BLD: 16.35 K/UL — HIGH (ref 3.8–10.5)
WBC # FLD AUTO: 16.35 K/UL — HIGH (ref 3.8–10.5)
WBC UR QL: 3 /HPF — SIGNIFICANT CHANGE UP (ref 0–5)

## 2024-01-31 PROCEDURE — 76805 OB US >/= 14 WKS SNGL FETUS: CPT | Mod: 26

## 2024-01-31 PROCEDURE — 99285 EMERGENCY DEPT VISIT HI MDM: CPT

## 2024-01-31 PROCEDURE — 76810 OB US >/= 14 WKS ADDL FETUS: CPT | Mod: 26

## 2024-01-31 RX ORDER — SODIUM CHLORIDE 9 MG/ML
1000 INJECTION INTRAMUSCULAR; INTRAVENOUS; SUBCUTANEOUS ONCE
Refills: 0 | Status: COMPLETED | OUTPATIENT
Start: 2024-01-31 | End: 2024-01-31

## 2024-01-31 RX ORDER — ACETAMINOPHEN 500 MG
1000 TABLET ORAL ONCE
Refills: 0 | Status: COMPLETED | OUTPATIENT
Start: 2024-01-31 | End: 2024-01-31

## 2024-01-31 RX ADMIN — SODIUM CHLORIDE 1000 MILLILITER(S): 9 INJECTION INTRAMUSCULAR; INTRAVENOUS; SUBCUTANEOUS at 21:40

## 2024-01-31 RX ADMIN — Medication 400 MILLIGRAM(S): at 21:40

## 2024-01-31 RX ADMIN — Medication 1000 MILLIGRAM(S): at 22:23

## 2024-01-31 NOTE — ED ADULT NURSE NOTE - OBJECTIVE STATEMENT
Pt states her OBGYN prescribed herpes without confirming herpes diagnosis and she is experiencing vaginal swelling and pain with inability to pee after taking medication since Monday (prescribed for morning and evening). Pt states pain 10/10 with movement and upon vaginal exam.

## 2024-01-31 NOTE — ED ADULT TRIAGE NOTE - CHIEF COMPLAINT QUOTE
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  pt reports swelling started since before taking meds and now has gotten worse.  pt 28 weeks pregnant- high risk pregnancy.  per L&D pt to be medically cleared in ED and then will go to L&D.

## 2024-01-31 NOTE — ED PROVIDER NOTE - PATIENT PORTAL LINK FT
You can access the FollowMyHealth Patient Portal offered by [facility] by registering at the following website: [email or web address]. By joining [facility]’s FollowMyHealth portal, you will also be able to view your health information using other applications (apps) compatible with our system.

## 2024-01-31 NOTE — ED PROVIDER NOTE - GENITOURINARY EXTERNAL GENERAL. FEMALE
(+) Lesions noted to bilat external labia, with swelling and tenderness to bilat labia. Exam performed by MIQUEL Schwarz, chaperoned by NICHOLE Alcantara and Jose/LESIONS

## 2024-01-31 NOTE — ED PROVIDER NOTE - NS ED MD DISPO DISCHARGE
· Todd as per [facility]

## 2024-01-31 NOTE — ED PROVIDER NOTE - NSFOLLOWUPINSTRUCTIONS_ED_ALL_ED_FT
PREGNANCY     Please follow up with your OBGYN as directed   Please follow recommendations from L+D department   Please take medications as instructed

## 2024-01-31 NOTE — ED PROVIDER NOTE - ATTENDING APP SHARED VISIT CONTRIBUTION OF CARE
I, Marco Antonio Louise, performed the initial face to face bedside interview with this patient regarding history of present illness, review of symptoms and relevant past medical, social and family history.  I completed an independent physical examination.  I was the initial provider who evaluated this patient. I have signed out the follow up of any pending tests (i.e. labs, radiological studies) to the ACP.  I have communicated the patient’s plan of care and disposition with the ACP. I, Marco Antonio Louise, performed the initial face to face bedside interview with this patient regarding history of present illness, review of symptoms and relevant past medical, social and family history.  I completed an independent physical examination.  I was the initial provider who evaluated this patient. I have signed out the follow up of any pending tests (i.e. labs, radiological studies) to the ACP.  I have communicated the patient’s plan of care and disposition with the ACP..

## 2024-01-31 NOTE — ED PROVIDER NOTE - PROGRESS NOTE DETAILS
Patient evaluated bedside. Mother at bedside. Patient is currently 28 weeks pregnant with twin gestation, she usually follows OBGYN Dr. Cage/Dr. Caicedo and goes to [facility]. Patient states 1 week ago she noticed a 'cut' on her labia, which then increased in pain and swelling. She went to her OBGYN who states it was herpes and started her on Valtrex. Patient states she has been taking the valtrex, however, the vaginal swelling has worsened. No vaginal bleeding. No known hx of STD/STIs. Exam performed in ED, revealed herpetic-like lesions and bilat labial swelling/tenderness. Exam chaperoned by NICHOLE Alcantara and Jose. Work-up started. OBGYN contacted and recommended to move to L+D for further work-up results and care. Case discussed with Attending. ~MIQUEL Schwarz

## 2024-01-31 NOTE — ED ADULT NURSE NOTE - NSFALLUNIVINTERV_ED_ALL_ED
Bed/Stretcher in lowest position, wheels locked, appropriate side rails in place/Call bell, personal items and telephone in reach/Instruct patient to call for assistance before getting out of bed/chair/stretcher/Non-slip footwear applied when patient is off stretcher/[place] to call system/Physically safe environment - no spills, clutter or unnecessary equipment/Purposeful proactive rounding/Room/bathroom lighting operational, light cord in reach

## 2024-01-31 NOTE — ED PROVIDER NOTE - OBJECTIVE STATEMENT
The patient is a 28 year old female presents with severe vaginal pain and dysuria recently diagnosed to have herpetic infection and currently being treated by valtrex by OB/Gyn MD and 28 week pregnant twin gestation and have scheduled   No fever, No chill, No CP, No SOB   The patient is a 28 year old female presents with severe vaginal pain and dysuria recently diagnosed to have herpetic infection and currently being treated by valtrex by OB/Gyn MD and 28 week pregnant twin gestation and have scheduled   No fever, No chill, No CP, No SOB The patient is a 28 year old female presents with severe vaginal pain, back pain (right>) and dysuria recently diagnosed to have herpetic infection and currently being treated by valtrex by OB/Gyn MD and 28 week pregnant twin gestation and have scheduled   No fever, No chill, No CP, No SOB

## 2024-01-31 NOTE — ED PROVIDER NOTE - CLINICAL SUMMARY MEDICAL DECISION MAKING FREE TEXT BOX
The patient is a 28 year old female 28 pregnant by date with twin gestation currently being treated for herpes vaginitis and will perform lab and US and OB consult as this is high risk pregnancy. The patient is a 28 year old female 28 pregnant by date with twin gestation currently being treated for herpes vaginitis and will perform lab and US and OB consult as this is high risk pregnancy. Upon exam, well appearing female in no acute distress. Upon pelvic exam, lesions consistent with genital herpes. Full pelvic exam to be performed by OBGYN due to patient discomfort. OBGYN made aware of case, patient to be discharged from ED and moved to L+D for further management and work-up. Case discussed with Attending. Patient aware and agreeable to plan. Leukocytosis, anemia, remainder of results pending and to be reviewed by OBGYN team.

## 2024-02-01 VITALS — DIASTOLIC BLOOD PRESSURE: 55 MMHG | HEART RATE: 97 BPM | SYSTOLIC BLOOD PRESSURE: 106 MMHG

## 2024-02-01 VITALS — DIASTOLIC BLOOD PRESSURE: 74 MMHG | HEART RATE: 105 BPM | SYSTOLIC BLOOD PRESSURE: 115 MMHG

## 2024-02-01 LAB
APPEARANCE UR: CLEAR — SIGNIFICANT CHANGE UP
BACTERIA # UR AUTO: ABNORMAL /HPF
BILIRUB UR-MCNC: NEGATIVE — SIGNIFICANT CHANGE UP
C TRACH RRNA SPEC QL NAA+PROBE: SIGNIFICANT CHANGE UP
CAST: 1 /LPF — SIGNIFICANT CHANGE UP (ref 0–4)
COLOR SPEC: YELLOW — SIGNIFICANT CHANGE UP
DIFF PNL FLD: ABNORMAL
GLUCOSE UR QL: NEGATIVE MG/DL — SIGNIFICANT CHANGE UP
HSV+VZV DNA SPEC QL NAA+PROBE: ABNORMAL
KETONES UR-MCNC: 15 MG/DL
LEUKOCYTE ESTERASE UR-ACNC: ABNORMAL
N GONORRHOEA RRNA SPEC QL NAA+PROBE: SIGNIFICANT CHANGE UP
NITRITE UR-MCNC: NEGATIVE — SIGNIFICANT CHANGE UP
PH UR: 6 — SIGNIFICANT CHANGE UP (ref 5–8)
PROT UR-MCNC: NEGATIVE MG/DL — SIGNIFICANT CHANGE UP
RAPID RVP RESULT: SIGNIFICANT CHANGE UP
RBC CASTS # UR COMP ASSIST: 2 /HPF — SIGNIFICANT CHANGE UP (ref 0–4)
SARS-COV-2 RNA SPEC QL NAA+PROBE: SIGNIFICANT CHANGE UP
SP GR SPEC: 1.01 — SIGNIFICANT CHANGE UP (ref 1–1.03)
SPECIMEN SOURCE: SIGNIFICANT CHANGE UP
SQUAMOUS # UR AUTO: 6 /HPF — HIGH (ref 0–5)
UROBILINOGEN FLD QL: 0.2 MG/DL — SIGNIFICANT CHANGE UP (ref 0.2–1)
WBC UR QL: 58 /HPF — HIGH (ref 0–5)

## 2024-02-01 PROCEDURE — 59025 FETAL NON-STRESS TEST: CPT

## 2024-02-01 PROCEDURE — 76810 OB US >/= 14 WKS ADDL FETUS: CPT

## 2024-02-01 PROCEDURE — 87529 HSV DNA AMP PROBE: CPT

## 2024-02-01 PROCEDURE — G0463: CPT

## 2024-02-01 PROCEDURE — 81001 URINALYSIS AUTO W/SCOPE: CPT

## 2024-02-01 PROCEDURE — 76805 OB US >/= 14 WKS SNGL FETUS: CPT

## 2024-02-01 PROCEDURE — 80053 COMPREHEN METABOLIC PANEL: CPT

## 2024-02-01 PROCEDURE — 87086 URINE CULTURE/COLONY COUNT: CPT

## 2024-02-01 PROCEDURE — 85025 COMPLETE CBC W/AUTO DIFF WBC: CPT

## 2024-02-01 PROCEDURE — 36415 COLL VENOUS BLD VENIPUNCTURE: CPT

## 2024-02-01 PROCEDURE — 87491 CHLMYD TRACH DNA AMP PROBE: CPT

## 2024-02-01 PROCEDURE — 87798 DETECT AGENT NOS DNA AMP: CPT

## 2024-02-01 PROCEDURE — 99284 EMERGENCY DEPT VISIT MOD MDM: CPT | Mod: 25

## 2024-02-01 PROCEDURE — 0225U NFCT DS DNA&RNA 21 SARSCOV2: CPT

## 2024-02-01 PROCEDURE — 84702 CHORIONIC GONADOTROPIN TEST: CPT

## 2024-02-01 PROCEDURE — 87591 N.GONORRHOEAE DNA AMP PROB: CPT

## 2024-02-01 PROCEDURE — 96374 THER/PROPH/DIAG INJ IV PUSH: CPT

## 2024-02-01 PROCEDURE — 99234 HOSP IP/OBS SM DT SF/LOW 45: CPT

## 2024-02-01 RX ORDER — LIDOCAINE 4 G/100G
1 CREAM TOPICAL ONCE
Refills: 0 | Status: COMPLETED | OUTPATIENT
Start: 2024-02-01 | End: 2024-02-01

## 2024-02-01 RX ORDER — LIDOCAINE HCL 20 MG/ML
20 VIAL (ML) INJECTION ONCE
Refills: 0 | Status: COMPLETED | OUTPATIENT
Start: 2024-02-01 | End: 2024-02-01

## 2024-02-01 RX ADMIN — LIDOCAINE 1 APPLICATION(S): 4 CREAM TOPICAL at 02:30

## 2024-02-01 RX ADMIN — Medication 20 MILLILITER(S): at 02:30

## 2024-02-01 NOTE — OB PROVIDER TRIAGE NOTE - NSHPPHYSICALEXAM_GEN_ALL_CORE
OBJECTIVE:  Physical Exam:  Gen: NAD, well-appearing, AAOx3   Abd: Soft, gravid  Ext: non-tender, non-edematous  SSE: vesicular lesions on left labia minora, right labia minora noted to have small scratch  SVE: deferred  Nurse present at bedside during all components of physical exam.     FHT: FHR reassuring  Dorr: no ctx

## 2024-02-01 NOTE — OB PROVIDER TRIAGE NOTE - NSHPLABSRESULTS_GEN_ALL_CORE
PROCEDURE DATE:  01/31/2024          INTERPRETATION:  CLINICAL INFORMATION: Pain and vaginal swelling, twin   pregnancy    Estimated Gestational Age by LMP: 28 weeks    Clinical Estimated Due Date: April 24, 2024    COMPARISON: None available.    TECHNIQUE:  Transabdominal sonography with additional biophysical profile   imaging.    FINDINGS:    Monochorionic, Diamniotic twin pregnancy with single, anterior placenta.   Cervix measures 6.3 cm and is closed.    TWIN A:  Fetal Biometric Measurements:  BPD 7.6 cm, 30 weeks 2 days  HC 26 cm, 28 weeks 2 days  AC  23.7 cm, 28 weeks  FL 5.2 cm, 27 weeks 6 days  Estimated fetal weight: 1180 grams +/- 177 g  Cephalic index: 86.5  HC/AC: 1.1    Presentation: Cephalic  Fetal heart motion: 146 bpm  Amniotic Fluid Index: Main vertical pocket is 5.7 cm.        TWIN B:  Fetal Biometric Measurements:  BPD 7.2 cm, 28 weeks 6 days  HC 25.5 cm, 27 weeks 5 days  AC 22.9 cm, 27 weeks 2 days  FL 5.1 cm, 27 weeks 3 days  Estimated fetal weight: 1073 grams  Cephalic index: 83.9  HC/AC: 1.1    Presentation:Breech  Fetal heart motion: 151 bpm  Amniotic Fluid Index: Main vertical pocket is 6.5 cm      IMPRESSION:  1. Live, monochorionic diamniotic twin pregnancy. Estimated gestational   age of twin A is 28 weeks and 4 days and estimated gestational age of   twin B is 27 weeks and 6 days based on fetal biometric metric   measurements.  2. Normal amniotic fluid.

## 2024-02-01 NOTE — OB PROVIDER TRIAGE NOTE - NSOBPROVIDERNOTE_OBGYN_ALL_OB_FT
29 yo  at 28w1d with mono-di twins presenting to L&D triage with complaint of severe vulvar pain and inability to void, patient evaluated for possible herpes lesions. She has no OB complaints at this time, FHR reassuring.    #Herpetic Lesions  - HSV PCR taken  - patient currently on Valtrex 1g TID per primary OBGYN  - Lidocaine jelly Rx'ed for pain and inability to void  - UA neg    Patient recommended to f/u with OBGYN    Dispo: home with f/u with primary OBGYN  Discussed with Dr. Martin 29 yo  at 28w1d with mono-di twins presenting to L&D triage with complaint of severe vulvar pain and inability to void, patient evaluated for possible herpes lesions. She has no OB complaints at this time, FHR reassuring.    #Herpetic Lesions  - HSV PCR taken  - patient currently on Valtrex 1g TID per primary OBGYN  - Lidocaine jelly Rx'ed for pain and inability to void  - UA neg    Patient recommended to f/u with OBGYN    Dispo: home with f/u with primary OBGYN  Discussed with Dr. Martin    Addendum:    Subjective Hx, Physical Exam, Laboratory & Imaging results reviewed.  I agree with the Resident Physician's assessment and plan of care, as discussed above.  Call, follow up, and return to Hospital parameters reviewed at length.  She was given the opportunity to ask questions and all were addressed.  Discharge home    Mihai Martin, DO

## 2024-02-01 NOTE — OB PROVIDER TRIAGE NOTE - HISTORY OF PRESENT ILLNESS
to be completed 27 yo  at 28w1d with mono-di twins presenting to L&D triage with complaint of severe vulvar pain and inability to void. Patient initially presented to ED and was triaged, then transferred to L&D for evaluation due to GA of 28+wks. Patient reports she recently started to have vulvar swelling and pain, she thinks she cut herself in the vulvar area and it is causing burning. She saw her OBGYN at [facility] who told her she had Herpes and she was started on Valtrex. Patient reports unbearable pain with voiding and struggled to void and provide a urine sample. Patient denies any abdominal pain, vaginal bleeding, leakage of fluid. She reports fetal movement and overall has no ob complaints.     Patient receives care with [facility]  KARTHIK 24  LMP sometime in May    POBHx:    3 chemical pregnancies, 1 mono-mono twins SAB s/p D&C, ectopic pregnancy s/p left salpingectomy  PGynHx: see above  PMHx:   PSHx: left salpingectomy, D&C  Med: Valtrex 1g TID started on Monday  All: Mobic, tdap vaccine, Benozadonate

## 2024-02-01 NOTE — OB RN TRIAGE NOTE - HEART RATE (BEATS/MIN)
"CONTACT:  SHARONA LOPEZ RN  UTILIZATION MANAGEMENT DEPT.   [facility]   [address] [facility], [address]   PHONE:  [phone]   FAX: [phone]       OBSERVATION AUTH REQUEST        Gisel Mena \"Jessica\" (35 y.o. Female)       Date of Birth   1988    Social Security Number       Address   [address] [place] [id number]    Home Phone   [phone]    MRN   [id number]       Presybeterian   None    Marital Status                               Admission Date   12/14/23    Admission Type   Emergency    Admitting Provider   Man Moody MD    Attending Provider   Man Moody MD    Department, Room/Bed   [facility], W234/1       Discharge Date       Discharge Disposition       Discharge Destination                                 Attending Provider: Man Moody MD    Allergies: No Known Allergies    Isolation: None   Infection: None   Code Status: Prior    Ht: 172.7 cm (68\")   Wt: 99.8 kg (220 lb)    Admission Cmt: None   Principal Problem: Miscarriage [O03.9]                   Active Insurance as of 12/14/2023       Primary Coverage       Payor Plan Insurance Group Employer/Plan Group    [employer] [place] [employer] [place]ICA[place]        Payor Plan Address Payor Plan Phone Number Payor Plan Fax Number Effective Dates    [address] [address] [phone]  3/1/2020 - None Entered    [facility] [id number]         Subscriber Name Subscriber Birth Date Member ID       GISEL MENA 1988 [id number]                     Emergency Contacts        (Rel.) Home Phone Work Phone Mobile Phone    Bharati Reddy (Friend) [phone] -- --              History & Physical    No notes of this type exist for this encounter.          Emergency Department Notes        Gualberto Lopez PCT at 12/15/23 0056          Pt provided bedside commode for use, pt did not want help from ED Tech to maneuver from bed to bedside. Pt has visitor that verbally stated they " would help pt, pt instructed to press call light for help.     Electronically signed by Gualberto Lopez PCT at 12/15/23 0057       Cathryn Franco, RN at 23 2314          Informed consent obtained from pt at this time for blood transfusion.     Electronically signed by Cathryn Franco RN at 23 2318       Elsa Erazo, RN at 23 2210          Report given to GEOFFREY Moreland    Electronically signed by Elsa Erazo RN at 23 2233       Iglesia Blancas MD at 23 1842          Subjective   History of Present Illness  HPI:   35-year-old female was in usual state of health.  This afternoon she started having some light abdominal cramping followed by bright red vaginal bleeding.  It continued with large amounts at the house which prompted call of EMS.  Patient is never experienced this before.  Last menstrual period was roughly 2 months ago.  Patient is sexually active.    Patient is      Review of systems:  Negative fever and chills  Negative new foods  Negative nausea vomiting  Negative melena or hematochezia  Negative diarrhea or constipation  Negative chest pain or palpitations  Negative shortness of breath or cough  Negative ill contact or travel  Negative swelling  Negative rash  Negative headaches or vision changes  Negative dysuria or hematuria  All other systems reviewed are negative      Past medical history -reviewed nursing notes: Lupus  Past surgical history -reviewed nursing notes:    Social history: -Reviewed nursing notes:  Medications and allergies -reviewed nursing notes:    Physical examination:  Vital signs: Reviewed  General: Nontoxic, nondistressed, AOx 4 pale in appearance  HEENT: Oral mucosa moist, pupils equal and reactive to light,, sclera is clear  Neck: Unremarkable  Lungs: Nonlabored breathing, equal rise of the chest, bilateral breath sounds are clear, negative cough wheezing crackles  Cardiovascular: Regular rate and rhythm, negative rubs or  murmurs  Abdomen: Soft, nontender to palpation, negative guarding rebound or distention, positive bowel sounds negative Seay sign  Legs: [Negative for edema]  Skin: [Negative petechiae,] [ negative rash]  Neuro: No gross motor or sensory deficit, speech is clear, movements are fluid without ataxia  Mood/affect: Unremarkable    Pelvic exam: With GEOFFREY Hunter  External exam: Unremarkable  Vaginal vault: Blood clots noted  Cervical os :  partially open no without foreign body      Medical Decision Making After reviewing HPI, review of systems available studies and history, producing an:  Type and screen  IVF  Transfuse 2 unit packed RBCs    Assessment and plan:   This patient is pale however nontoxic on examination currently her vital signs stable with active vaginal bleeding.  No retained products in the cervical os.  Patient does have a positive hCG.  Will go ahead and transfuse 2 units.  Patient is having no specific pain to imply an ectopic yet will likely proceed with ultrasound,    I discussed the case with gynecology Dr. Moody,  he request ultrasound.    I discussed the case again with the gynecologist, Dr. Moody will admit the patient for further care, I greatly appreciate his assistance    Discussed with  laboratory, radiology studies, diagnosis and action plan, and follow-up.  Patient verbalized understanding and agreement    Clinical impression:  Miscarriage in first trimester pregnancy  Vaginal bleeding: Acute secondary to above  Normocytic anemia: Secondary to hemorrhaging  Critical care time 45 minutes excluding other billable procedures  Disposition: Admit  Condition: Stable     This note was performed, in part, by voice-recognition software and may contain dictated related graphical errors including word substitution errors may exist.  These areas have been corrected to the best of my ability, however some errors may remain due to prior physician of patient care.    Under the 21st Century Cures Act,  medical progress note should be visible to the patient.  It must be taken into consideration that these notes will include professional medical terminology, protocols and practices that may be somewhat confusing without interpretation from your medical team.  It is strongly advisable to review this documentation with your primary care provider.  The intent of this progress note and other documents are to communicate information between medical professionals involved in your care and to serve as future reference for physicians or other professionals when reviewing your case        Review of Systems    Past Medical History:   Diagnosis Date    Lupus     Substance abuse     Urinary tract infection        No Known Allergies    Past Surgical History:   Procedure Laterality Date    ADENOIDECTOMY      APPENDECTOMY      CHOLECYSTECTOMY      TONSILLECTOMY         Family History   Problem Relation Age of Onset    Diabetes Mother     Congenital heart disease Mother        Social History     Socioeconomic History    Marital status:    Tobacco Use    Smoking status: Every Day     Packs/day: 3     Types: Cigarettes    Smokeless tobacco: Never   Vaping Use    Vaping Use: Some days   Substance and Sexual Activity    Alcohol use: No    Drug use: No    Sexual activity: Not Currently     Partners: Male           Objective   Physical Exam    Procedures          ED Course                                             Medical Decision Making  Problems Addressed:  Vaginal bleeding, abnormal: complicated acute illness or injury    Amount and/or Complexity of Data Reviewed  Labs: ordered.  Radiology: ordered.    Risk  Decision regarding hospitalization.        Final diagnoses:   Vaginal bleeding, abnormal       ED Disposition  ED Disposition       ED Disposition   Decision to Admit    Condition   --    Comment   --               No follow-up provider specified.       Medication List      No changes were made to your prescriptions  during this visit.            Iglesia Blancas MD  12/15/23 0202      Electronically signed by Iglesia Blancas MD at 12/15/23 0202       Gualberto Lopez PCT at 12/14/23 1738          POC Glucose 100 mg/dL, RN aware.     Electronically signed by Gualberto Lopez PCT at 12/14/23 1738       Facility-Administered Medications as of 12/15/2023   Medication Dose Route Frequency Provider Last Rate Last Admin    acetaminophen (TYLENOL) tablet 650 mg  650 mg Oral Q6H PRN Man Moody MD   650 mg at 12/15/23 0250    ceFAZolin 2000 mg IVPB in 100 mL NS (VTB)  2,000 mg Intravenous Q8H Man Moody MD   2,000 mg at 12/15/23 0351    lactated ringers infusion  75 mL/hr Intravenous Continuous Man Moody MD 75 mL/hr at 12/15/23 0347 75 mL/hr at 12/15/23 0347    [COMPLETED] ondansetron (ZOFRAN) injection 4 mg  4 mg Intravenous Once Iglesia Blancas MD   4 mg at 12/14/23 2157    [COMPLETED] sodium chloride 0.9 % bolus 500 mL  500 mL Intravenous Once Iglesia Blancas MD   Stopped at 12/14/23 2318    sodium chloride 0.9 % flush 10 mL  10 mL Intravenous PRN Jacinto Martinez APRN         Orders (all)        Start     Ordered    12/15/23 1000  Hemoglobin & Hematocrit, Blood  Once         12/15/23 0639    12/15/23 0330  ceFAZolin 2000 mg IVPB in 100 mL NS (VTB)  Every 8 Hours         12/15/23 0239    12/15/23 0330  lactated ringers infusion  Continuous         12/15/23 0239    12/15/23 0238  acetaminophen (TYLENOL) tablet 650 mg  Every 6 Hours PRN         12/15/23 0239    12/15/23 0156  NPO Diet NPO Type: Strict NPO  Diet Effective Now         12/15/23 0156    12/15/23 0155  IP General Consult (Use specialty-specific consult if known)  Once        Provider:  Man Moody MD    12/15/23 0156    12/15/23 0155  Initiate Observation Status  Once         12/15/23 0156    12/14/23 2217  Antibody Identification  Once         12/14/23 2216    12/14/23 2201  ondansetron (ZOFRAN) injection 4 mg  Once         12/14/23 2148    12/14/23  2156  sodium chloride 0.9 % bolus 500 mL  Once         12/14/23 2140    12/14/23 1915  US Ob < 14 Weeks Single or First Gestation  1 Time Imaging         12/14/23 1914 12/14/23 1849  Verify Informed Consent for Blood Product Administration  Once         12/14/23 1848    12/14/23 1849  Prepare RBC, 2 Units  Blood - Once         12/14/23 1848    12/14/23 1848  Transfuse RBC, 2 Units Infuse Each Unit Over: 3.5H  Transfusion       12/14/23 1848    12/14/23 1848  hCG, Quantitative, Pregnancy  STAT         12/14/23 1847    12/14/23 1835  Scan Slide  Once         12/14/23 1834    12/14/23 1825  aPTT  Once         12/14/23 1824    12/14/23 1825  Protime-INR  Once         12/14/23 1824    12/14/23 1824  CBC & Differential  Once,   Status:  Canceled         12/14/23 1823    12/14/23 1824  Basic Metabolic Panel  Once,   Status:  Canceled         12/14/23 1823    12/14/23 1824  Protime-INR  Once,   Status:  Canceled         12/14/23 1823    12/14/23 1824  aPTT  Once,   Status:  Canceled         12/14/23 1823    12/14/23 1824  hCG, Quantitative, Pregnancy  STAT,   Status:  Canceled         12/14/23 1823    12/14/23 1824  CBC Auto Differential  PROCEDURE ONCE,   Status:  Canceled         12/14/23 1823    12/14/23 1809  Type & Screen  STAT         12/14/23 1808    12/14/23 1756  [place] Draw  Once         12/14/23 1755    12/14/23 1756  Comprehensive Metabolic Panel  Once         12/14/23 1755    12/14/23 1756  CBC & Differential  Once         12/14/23 1755    12/14/23 1756  hCG, Serum, Qualitative  STAT         12/14/23 1755    12/14/23 1756  Insert Peripheral IV  Once        [facility] for full Linked Orders Report.    12/14/23 1755    12/14/23 1756  Green Top (Gel)  PROCEDURE ONCE         12/14/23 1755    12/14/23 1756  Lavender Top  PROCEDURE ONCE         12/14/23 1755    12/14/23 1756  Gold Top - SST  PROCEDURE ONCE,   Status:  Canceled         12/14/23 1755 12/14/23 1756  Light Blue Top  PROCEDURE ONCE          12/14/23 1755    12/14/23 1756  CBC Auto Differential  PROCEDURE ONCE         12/14/23 1755    12/14/23 1755  sodium chloride 0.9 % flush 10 mL  As Needed        See [facility] for full Linked Orders Report.    12/14/23 1755    12/14/23 1742  POC Glucose Once  PROCEDURE ONCE        Comments: Complete no more than 45 minutes prior to patient eating      12/14/23 1735                  Physician Progress Notes (all)    No notes of this type exist for this encounter.       Consult Notes (all)    No notes of this type exist for this encounter.        105

## 2024-02-02 DIAGNOSIS — O30.033 TWIN PREGNANCY, MONOCHORIONIC/DIAMNIOTIC, THIRD TRIMESTER: ICD-10-CM

## 2024-02-02 DIAGNOSIS — O09.293 SUPERVISION OF PREGNANCY WITH OTHER POOR REPRODUCTIVE OR OBSTETRIC HISTORY, THIRD TRIMESTER: ICD-10-CM

## 2024-02-02 DIAGNOSIS — O99.353 DISEASES OF THE NERVOUS SYSTEM COMPLICATING PREGNANCY, THIRD TRIMESTER: ICD-10-CM

## 2024-02-02 DIAGNOSIS — O98.313 OTHER INFECTIONS WITH A PREDOMINANTLY SEXUAL MODE OF TRANSMISSION COMPLICATING PREGNANCY, THIRD TRIMESTER: ICD-10-CM

## 2024-02-02 DIAGNOSIS — Z3A.28 28 WEEKS GESTATION OF PREGNANCY: ICD-10-CM

## 2024-02-02 DIAGNOSIS — O99.891 OTHER SPECIFIED DISEASES AND CONDITIONS COMPLICATING PREGNANCY: ICD-10-CM

## 2024-02-02 DIAGNOSIS — G43.909 MIGRAINE, UNSPECIFIED, NOT INTRACTABLE, WITHOUT STATUS MIGRAINOSUS: ICD-10-CM

## 2024-02-02 DIAGNOSIS — O09.13 SUPERVISION OF PREGNANCY WITH HISTORY OF ECTOPIC PREGNANCY, THIRD TRIMESTER: ICD-10-CM

## 2024-02-02 DIAGNOSIS — M79.7 FIBROMYALGIA: ICD-10-CM

## 2024-02-02 DIAGNOSIS — Z87.59 PERSONAL HISTORY OF OTHER COMPLICATIONS OF PREGNANCY, CHILDBIRTH AND THE PUERPERIUM: ICD-10-CM

## 2024-02-02 LAB
CULTURE RESULTS: SIGNIFICANT CHANGE UP
SPECIMEN SOURCE: SIGNIFICANT CHANGE UP

## 2024-03-05 ENCOUNTER — APPOINTMENT (OUTPATIENT)
Dept: RHEUMATOLOGY | Facility: CLINIC | Age: 29
End: 2024-03-05

## 2024-05-17 ENCOUNTER — OUTPATIENT (OUTPATIENT)
Dept: OUTPATIENT SERVICES | Facility: HOSPITAL | Age: 29
LOS: 1 days | End: 2024-05-17
Payer: COMMERCIAL

## 2024-05-17 DIAGNOSIS — Z01.818 ENCOUNTER FOR OTHER PREPROCEDURAL EXAMINATION: ICD-10-CM

## 2024-05-17 LAB
A1C WITH ESTIMATED AVERAGE GLUCOSE RESULT: 4.5 % — SIGNIFICANT CHANGE UP (ref 4–5.6)
ANION GAP SERPL CALC-SCNC: 12 MMOL/L — SIGNIFICANT CHANGE UP (ref 5–17)
APTT BLD: 46.5 SEC — HIGH (ref 24.5–35.6)
BASOPHILS # BLD AUTO: 0 K/UL — SIGNIFICANT CHANGE UP (ref 0–0.2)
BASOPHILS NFR BLD AUTO: 0 % — SIGNIFICANT CHANGE UP (ref 0–2)
BUN SERPL-MCNC: 16.6 MG/DL — SIGNIFICANT CHANGE UP (ref 8–20)
BURR CELLS BLD QL SMEAR: PRESENT — SIGNIFICANT CHANGE UP
CALCIUM SERPL-MCNC: 8.9 MG/DL — SIGNIFICANT CHANGE UP (ref 8.4–10.5)
CHLORIDE SERPL-SCNC: 105 MMOL/L — SIGNIFICANT CHANGE UP (ref 96–108)
CO2 SERPL-SCNC: 23 MMOL/L — SIGNIFICANT CHANGE UP (ref 22–29)
CREAT SERPL-MCNC: 0.69 MG/DL — SIGNIFICANT CHANGE UP (ref 0.5–1.3)
EGFR: 123 ML/MIN/1.73M2 — SIGNIFICANT CHANGE UP
ELLIPTOCYTES BLD QL SMEAR: SLIGHT — SIGNIFICANT CHANGE UP
EOSINOPHIL # BLD AUTO: 0.15 K/UL — SIGNIFICANT CHANGE UP (ref 0–0.5)
EOSINOPHIL NFR BLD AUTO: 2.7 % — SIGNIFICANT CHANGE UP (ref 0–6)
ESTIMATED AVERAGE GLUCOSE: 82 MG/DL — SIGNIFICANT CHANGE UP (ref 68–114)
GLUCOSE SERPL-MCNC: 92 MG/DL — SIGNIFICANT CHANGE UP (ref 70–99)
HCG SERPL-ACNC: <4 MIU/ML — SIGNIFICANT CHANGE UP
HCT VFR BLD CALC: 33.8 % — LOW (ref 34.5–45)
HGB BLD-MCNC: 11 G/DL — LOW (ref 11.5–15.5)
INR BLD: 1.93 RATIO — HIGH (ref 0.85–1.18)
LYMPHOCYTES # BLD AUTO: 0.56 K/UL — LOW (ref 1–3.3)
LYMPHOCYTES # BLD AUTO: 10 % — LOW (ref 13–44)
MANUAL SMEAR VERIFICATION: SIGNIFICANT CHANGE UP
MCHC RBC-ENTMCNC: 29.3 PG — SIGNIFICANT CHANGE UP (ref 27–34)
MCHC RBC-ENTMCNC: 32.5 GM/DL — SIGNIFICANT CHANGE UP (ref 32–36)
MCV RBC AUTO: 90.1 FL — SIGNIFICANT CHANGE UP (ref 80–100)
METAMYELOCYTES # FLD: 0.9 % — HIGH (ref 0–0)
MONOCYTES # BLD AUTO: 0.21 K/UL — SIGNIFICANT CHANGE UP (ref 0–0.9)
MONOCYTES NFR BLD AUTO: 3.7 % — SIGNIFICANT CHANGE UP (ref 2–14)
NEUTROPHILS # BLD AUTO: 4.56 K/UL — SIGNIFICANT CHANGE UP (ref 1.8–7.4)
NEUTROPHILS NFR BLD AUTO: 81.8 % — HIGH (ref 43–77)
PLAT MORPH BLD: NORMAL — SIGNIFICANT CHANGE UP
PLATELET # BLD AUTO: 343 K/UL — SIGNIFICANT CHANGE UP (ref 150–400)
POIKILOCYTOSIS BLD QL AUTO: SLIGHT — SIGNIFICANT CHANGE UP
POLYCHROMASIA BLD QL SMEAR: SLIGHT — SIGNIFICANT CHANGE UP
POTASSIUM SERPL-MCNC: 4.6 MMOL/L — SIGNIFICANT CHANGE UP (ref 3.5–5.3)
POTASSIUM SERPL-SCNC: 4.6 MMOL/L — SIGNIFICANT CHANGE UP (ref 3.5–5.3)
PROTHROM AB SERPL-ACNC: 21 SEC — HIGH (ref 9.5–13)
RBC # BLD: 3.75 M/UL — LOW (ref 3.8–5.2)
RBC # FLD: 15.9 % — HIGH (ref 10.3–14.5)
RBC BLD AUTO: ABNORMAL
SODIUM SERPL-SCNC: 140 MMOL/L — SIGNIFICANT CHANGE UP (ref 135–145)
VARIANT LYMPHS # BLD: 0.9 % — SIGNIFICANT CHANGE UP (ref 0–6)
WBC # BLD: 5.57 K/UL — SIGNIFICANT CHANGE UP (ref 3.8–10.5)
WBC # FLD AUTO: 5.57 K/UL — SIGNIFICANT CHANGE UP (ref 3.8–10.5)

## 2024-05-17 PROCEDURE — 80048 BASIC METABOLIC PNL TOTAL CA: CPT

## 2024-05-17 PROCEDURE — 85610 PROTHROMBIN TIME: CPT

## 2024-05-17 PROCEDURE — 83036 HEMOGLOBIN GLYCOSYLATED A1C: CPT

## 2024-05-17 PROCEDURE — 85025 COMPLETE CBC W/AUTO DIFF WBC: CPT

## 2024-05-17 PROCEDURE — G0463: CPT

## 2024-05-17 PROCEDURE — 85730 THROMBOPLASTIN TIME PARTIAL: CPT

## 2024-05-17 PROCEDURE — 36415 COLL VENOUS BLD VENIPUNCTURE: CPT

## 2024-05-17 PROCEDURE — 84702 CHORIONIC GONADOTROPIN TEST: CPT

## 2024-05-18 NOTE — CHART NOTE - NSCHARTNOTEFT_GEN_A_CORE
Patients INR 1.93, on coumadin 3mg daily. Called patient made aware of INR, states she is following with hematology for INR check. Advised patient will make the hematology office aware and to follow up with directions from hematology office, patient agrees and verbalized understanding. Called Hematology office of Dr. David, spoke with on call provider Dr. Cardoso at 1045, made Dr. Cardoso aware of patients INR, Dr. Cardoso requested to have the labs sent to the office and they would address the patients INR level. Labs faxed to hematology office. Dr. Rader made aware. AS FNP-BC

## 2024-09-05 ENCOUNTER — OFFICE (OUTPATIENT)
Dept: URBAN - METROPOLITAN AREA CLINIC 12 | Facility: CLINIC | Age: 29
Setting detail: OPHTHALMOLOGY
End: 2024-09-05
Payer: COMMERCIAL

## 2024-09-05 DIAGNOSIS — H16.223: ICD-10-CM

## 2024-09-05 DIAGNOSIS — M06.9: ICD-10-CM

## 2024-09-05 DIAGNOSIS — H43.393: ICD-10-CM

## 2024-09-05 DIAGNOSIS — H52.13: ICD-10-CM

## 2024-09-05 DIAGNOSIS — Z79.899: ICD-10-CM

## 2024-09-05 PROCEDURE — 92083 EXTENDED VISUAL FIELD XM: CPT | Performed by: OPHTHALMOLOGY

## 2024-09-05 PROCEDURE — 92134 CPTRZ OPH DX IMG PST SGM RTA: CPT | Performed by: OPHTHALMOLOGY

## 2024-09-05 PROCEDURE — 92014 COMPRE OPH EXAM EST PT 1/>: CPT | Performed by: OPHTHALMOLOGY

## 2024-09-05 ASSESSMENT — CONFRONTATIONAL VISUAL FIELD TEST (CVF)
OD_FINDINGS: FULL
OS_FINDINGS: FULL

## 2024-11-17 ENCOUNTER — NON-APPOINTMENT (OUTPATIENT)
Age: 29
End: 2024-11-17

## 2025-04-11 ENCOUNTER — NON-APPOINTMENT (OUTPATIENT)
Age: 30
End: 2025-04-11

## 2025-04-14 ENCOUNTER — NON-APPOINTMENT (OUTPATIENT)
Age: 30
End: 2025-04-14

## 2025-06-27 ENCOUNTER — OFFICE (OUTPATIENT)
Dept: URBAN - METROPOLITAN AREA CLINIC 6 | Facility: CLINIC | Age: 30
Setting detail: OPHTHALMOLOGY
End: 2025-06-27
Payer: COMMERCIAL

## 2025-06-27 DIAGNOSIS — H16.223: ICD-10-CM

## 2025-06-27 DIAGNOSIS — Z79.899: ICD-10-CM

## 2025-06-27 DIAGNOSIS — M06.9: ICD-10-CM

## 2025-06-27 PROBLEM — H57.02 ANISOCORIA IRREGULAR PUPIL: Status: ACTIVE | Noted: 2025-06-27

## 2025-06-27 PROCEDURE — 92014 COMPRE OPH EXAM EST PT 1/>: CPT | Performed by: OPHTHALMOLOGY

## 2025-06-27 ASSESSMENT — REFRACTION_CURRENTRX
OS_OVR_VA: 20/
OD_VPRISM_DIRECTION: SV
OS_VPRISM_DIRECTION: SV
OD_CYLINDER: SPHERE
OD_OVR_VA: 20/
OS_CYLINDER: -0.50
OS_AXIS: 004
OS_SPHERE: -3.00
OD_AXIS: 0
OD_SPHERE: -3.00

## 2025-06-27 ASSESSMENT — KERATOMETRY
OD_K2POWER_DIOPTERS: 40.00
OS_K1POWER_DIOPTERS: 39.50
OD_AXISANGLE_DEGREES: 050
METHOD_AUTO_MANUAL: AUTO
OS_K2POWER_DIOPTERS: 39.75
OS_AXISANGLE_DEGREES: 114
OD_K1POWER_DIOPTERS: 39.75

## 2025-06-27 ASSESSMENT — TONOMETRY
OS_IOP_MMHG: 12
OD_IOP_MMHG: 15

## 2025-06-27 ASSESSMENT — CONFRONTATIONAL VISUAL FIELD TEST (CVF)
OS_FINDINGS: FULL
OD_FINDINGS: FULL

## 2025-06-27 ASSESSMENT — VISUAL ACUITY
OS_BCVA: 20/15
OD_BCVA: 20/15

## 2025-06-27 ASSESSMENT — REFRACTION_MANIFEST
OS_CYLINDER: -0.50
OS_AXIS: 005
OD_SPHERE: -2.87
OD_AXIS: 015
OD_VA1: 20/20
OS_SPHERE: -3.00
OD_SPHERE: -3.00
OS_AXIS: 005
OS_VA1: 20/20-1
OS_SPHERE: -3.00
OS_VA1: 20/20
OD_CYLINDER: SPHERE
OD_AXIS: 0
OD_VA1: 20/20-1
OS_CYLINDER: -0.50
OD_CYLINDER: -0.25

## 2025-06-27 ASSESSMENT — REFRACTION_AUTOREFRACTION
OS_SPHERE: +0.50
OD_CYLINDER: SPH
OS_CYLINDER: -0.25
OS_AXIS: 155
OD_SPHERE: +0.50

## 2025-06-27 ASSESSMENT — SUPERFICIAL PUNCTATE KERATITIS (SPK)
OD_SPK: 1+
OS_SPK: 1+